# Patient Record
Sex: MALE | Race: WHITE | Employment: FULL TIME | URBAN - NONMETROPOLITAN AREA
[De-identification: names, ages, dates, MRNs, and addresses within clinical notes are randomized per-mention and may not be internally consistent; named-entity substitution may affect disease eponyms.]

---

## 2019-04-15 ENCOUNTER — OFFICE VISIT (OUTPATIENT)
Dept: PRIMARY CARE CLINIC | Age: 59
End: 2019-04-15
Payer: COMMERCIAL

## 2019-04-15 VITALS
SYSTOLIC BLOOD PRESSURE: 139 MMHG | OXYGEN SATURATION: 97 % | WEIGHT: 315 LBS | BODY MASS INDEX: 47.14 KG/M2 | DIASTOLIC BLOOD PRESSURE: 89 MMHG | TEMPERATURE: 98 F | HEART RATE: 84 BPM

## 2019-04-15 DIAGNOSIS — R05.9 COUGH: ICD-10-CM

## 2019-04-15 DIAGNOSIS — J20.9 BRONCHITIS, ACUTE, WITH BRONCHOSPASM: Primary | ICD-10-CM

## 2019-04-15 LAB
INFLUENZA A ANTIBODY: NEGATIVE
INFLUENZA B ANTIBODY: NEGATIVE

## 2019-04-15 PROCEDURE — 99203 OFFICE O/P NEW LOW 30 MIN: CPT | Performed by: NURSE PRACTITIONER

## 2019-04-15 PROCEDURE — 87804 INFLUENZA ASSAY W/OPTIC: CPT | Performed by: NURSE PRACTITIONER

## 2019-04-15 PROCEDURE — 94640 AIRWAY INHALATION TREATMENT: CPT | Performed by: NURSE PRACTITIONER

## 2019-04-15 RX ORDER — BENZONATATE 100 MG/1
100 CAPSULE ORAL 3 TIMES DAILY PRN
Qty: 21 CAPSULE | Refills: 0 | Status: SHIPPED | OUTPATIENT
Start: 2019-04-15 | End: 2019-04-22

## 2019-04-15 RX ORDER — PREDNISONE 20 MG/1
TABLET ORAL
Qty: 10 TABLET | Refills: 0 | Status: SHIPPED | OUTPATIENT
Start: 2019-04-15 | End: 2020-03-06 | Stop reason: ALTCHOICE

## 2019-04-15 RX ORDER — ALBUTEROL SULFATE 2.5 MG/3ML
2.5 SOLUTION RESPIRATORY (INHALATION) ONCE
Status: COMPLETED | OUTPATIENT
Start: 2019-04-15 | End: 2019-04-15

## 2019-04-15 RX ORDER — ALBUTEROL SULFATE 90 UG/1
AEROSOL, METERED RESPIRATORY (INHALATION)
Qty: 1 INHALER | Refills: 0 | Status: ON HOLD | OUTPATIENT
Start: 2019-04-15 | End: 2020-03-06 | Stop reason: ALTCHOICE

## 2019-04-15 RX ORDER — AMOXICILLIN AND CLAVULANATE POTASSIUM 875; 125 MG/1; MG/1
1 TABLET, FILM COATED ORAL 2 TIMES DAILY
Qty: 20 TABLET | Refills: 0 | Status: SHIPPED | OUTPATIENT
Start: 2019-04-15 | End: 2019-04-25

## 2019-04-15 RX ADMIN — ALBUTEROL SULFATE 2.5 MG: 2.5 SOLUTION RESPIRATORY (INHALATION) at 11:56

## 2019-04-15 ASSESSMENT — ENCOUNTER SYMPTOMS
WHEEZING: 1
COUGH: 1
SHORTNESS OF BREATH: 1
SINUS PAIN: 0
NAUSEA: 0
SINUS PRESSURE: 0
RHINORRHEA: 1
DIARRHEA: 0
VOMITING: 0
SORE THROAT: 1

## 2019-04-15 NOTE — PATIENT INSTRUCTIONS
label.  · Breathe moist air from a humidifier, hot shower, or sink filled with hot water. The heat and moisture will thin mucus so you can cough it out. · Do not smoke. Smoking can make bronchitis worse. If you need help quitting, talk to your doctor about stop-smoking programs and medicines. These can increase your chances of quitting for good. When should you call for help? Call 911 anytime you think you may need emergency care. For example, call if:    · You have severe trouble breathing.    Call your doctor now or seek immediate medical care if:    · You have new or worse trouble breathing.     · You cough up dark brown or bloody mucus (sputum).     · You have a new or higher fever.     · You have a new rash.    Watch closely for changes in your health, and be sure to contact your doctor if:    · You cough more deeply or more often, especially if you notice more mucus or a change in the color of your mucus.     · You are not getting better as expected. Where can you learn more? Go to https://[employer].[email or web address] org and sign in to your [employer] account. Enter H333 in the Spotted box to learn more about \"Bronchitis: Care Instructions. \"     If you do not have an account, please click on the \"Sign Up Now\" link. Current as of: September 5, 2018  Content Version: 11.9  © 8671-1417 [employer]. Care instructions adapted under license by [facility] ([facility]). If you have questions about a medical condition or this instruction, always ask your healthcare professional. [place] [address] any warranty or liability for your use of this information. Patient Education        amoxicillin and clavulanate potassium  Pronunciation:  am OK i RAMOS in [address] ate mac TAS ee um  Brand:  Augmentin, Augmentin ES-600, Augmentin XR  What is the most important information I should know about amoxicillin and clavulanate potassium?   You should not use this medicine if you have severe kidney disease, if you have had liver problems or jaundice while taking amoxicillin and clavulanate potassium, or if you are allergic to any penicillin or cephalosporin antibiotic, such as Amoxil, Ceftin, Cefzil, Moxatag, Omnicef, and others. What is amoxicillin and clavulanate potassium? Amoxicillin is a penicillin antibiotic that fights bacteria in the body. Clavulanate potassium is a beta-lactamase inhibitor that helps prevent certain bacteria from becoming resistant to amoxicillin. Amoxicillin and clavulanate potassium is a combination medicine used to treat many different infections caused by bacteria, such as sinusitis, pneumonia, ear infections, bronchitis, urinary tract infections, and infections of the skin. Amoxicillin and clavulanate potassium may also be used for purposes not listed in this medication guide. What should I discuss with my healthcare provider before taking amoxicillin and clavulanate potassium? You should not use this medicine if you are allergic to it, or if:  · you have severe kidney disease (or if you are on dialysis);  · you have had liver problems or jaundice while taking amoxicillin and clavulanate potassium; or  · you are allergic to any penicillin or cephalosporin antibiotic, such as Amoxil, Ceftin, Cefzil, Moxatag, Omnicef, and others. To make sure amoxicillin and clavulanate potassium is safe for you, tell your doctor if you have ever had:  · liver disease (hepatitis or jaundice);  · kidney disease; or  · mononucleosis. It is not known whether this medicine will harm an unborn baby. Tell your doctor if you are pregnant or plan to become pregnant. Amoxicillin and clavulanate potassium can make birth control pills less effective. Ask your doctor about using a non-hormonal birth control (condom, diaphragm with spermicide) to prevent pregnancy. Amoxicillin and clavulanate potassium can pass into breast milk and may affect the nursing baby.  Tell your doctor if you are breast-feeding. Do not give this medicine to a child without medical advice. The liquid or chewable tablet may contain phenylalanine. Talk to your doctor before using these forms of this medicine if you have phenylketonuria (PKU). How should I take amoxicillin and clavulanate potassium? Follow all directions on your prescription label. Do not take this medicine in larger or smaller amounts or for longer than recommended. Take the medicine every 12 hours, at the start of a meal to reduce stomach upset. Do not crush or chew the extended-release tablet. Swallow the pill whole, or break the pill in half and take both halves one at a time. If you have trouble swallowing a whole or half pill, talk with your doctor about using another form of amoxicillin and clavulanate potassium. The chewable tablet must be chewed before you swallow it. Shake the liquid medicine well just before you measure a dose. Measure liquid medicine with the dosing syringe provided, or with a special dose-measuring spoon or medicine cup. If you do not have a dose-measuring device, ask your pharmacist for one. Use this medicine for the full prescribed length of time. Your symptoms may improve before the infection is completely cleared. Skipping doses may also increase your risk of further infection that is resistant to antibiotics. Amoxicillin and clavulanate potassium will not treat a viral infection such as the flu or a common cold. This medicine can cause unusual results with certain lab tests for glucose (sugar) in the urine. Tell any doctor who treats you that you are using amoxicillin and clavulanate potassium. Store the tablets at room temperature away from moisture and heat. Store the liquid  in the refrigerator. Throw away any unused liquid after 10 days. What happens if I miss a dose? Take the missed dose as soon as you remember. Skip the missed dose if it is almost time for your next scheduled dose.  Do not take extra medicine to make up the missed dose. What happens if I overdose? Seek emergency medical attention or call the Poison Help line at 1-507.634.1701. Overdose can cause nausea, vomiting, stomach pain, diarrhea, skin rash, drowsiness, hyperactivity, and decreased urination. What should I avoid while taking amoxicillin and clavulanate potassium? Avoid taking this medicine together with or just after eating a high-fat meal. This will make it harder for your body to absorb the medication. Antibiotic medicines can cause diarrhea, which may be a sign of a new infection. If you have diarrhea that is watery or bloody, call your doctor. Do not use anti-diarrhea medicine unless your doctor tells you to. What are the possible side effects of amoxicillin and clavulanate potassium? Get emergency medical help if you have signs of an allergic reaction: hives; difficult breathing; swelling of your face, lips, tongue, or throat. Call your doctor at once if you have:  · severe stomach pain, diarrhea that is watery or bloody;  · pale or yellowed skin, dark colored urine, fever, confusion or weakness;  · loss of appetite, upper stomach pain, jaundice (yellowing of the skin or eyes);  · easy bruising or bleeding;  · little or no urination; or  · severe skin reaction --fever, sore throat, swelling in your face or tongue, burning in your eyes, skin pain followed by a red or purple skin rash that spreads (especially in the face or upper body) and causes blistering and peeling. Common side effects may include:  · nausea, diarrhea; or  · vaginal itching or discharge; This is not a complete list of side effects and others may occur. Call your doctor for medical advice about side effects. You may report side effects to FDA at 6-217-FDA-7500. What other drugs will affect amoxicillin and clavulanate potassium?   Tell your doctor about all your current medicines and any you start or stop using, especially:  · allopurinol;  · probenecid; or  · a blood thinner --warfarin, Coumadin, Jantoven. This list is not complete. Other drugs may interact with amoxicillin and clavulanate potassium, including prescription and over-the-counter medicines, vitamins, and herbal products. Not all possible interactions are listed in this medication guide. Where can I get more information? Your pharmacist can provide more information about amoxicillin and clavulanate potassium. Remember, keep this and all other medicines out of the reach of children, never share your medicines with others, and use this medication only for the indication prescribed. Every effort has been made to ensure that the information provided by [address] is accurate, up-to-date, and complete, but no guarantee is made to that effect. Drug information contained herein may be time sensitive. [facility] information has been compiled for use by healthcare practitioners and consumers in the [place] and therefore [facility] does not warrant that uses outside of the [place] are appropriate, unless specifically indicated otherwise. [facility]'s drug information does not endorse drugs, diagnose patients or recommend therapy. [facility]'s drug information is an informational resource designed to assist licensed healthcare practitioners in caring for their patients and/or to serve consumers viewing this service as a supplement to, and not a substitute for, the expertise, skill, knowledge and judgment of healthcare practitioners. The absence of a warning for a given drug or drug combination in no way should be construed to indicate that the drug or drug combination is safe, effective or appropriate for any given patient. [facility] does not assume any responsibility for any aspect of healthcare administered with the aid of information [facility] provides.  The information contained herein is not intended to cover all possible uses, directions, precautions, warnings, drug interactions, allergic reactions, or adverse effects. If you have questions about the drugs you are taking, check with your doctor, nurse or pharmacist.  Copyright 5727-2376 [facility] CINTR[place]. Version: 11.02. Revision date: 1/2/2018. Care instructions adapted under license by [facility] ([facility]). If you have questions about a medical condition or this instruction, always ask your healthcare professional. [place] [address] any warranty or liability for your use of this information. Patient Education        prednisone  Pronunciation:  PRED ni ari  Brand:  Ghazal, Sterapred, Sterapred 12 DAY, Sterapred DS, Sterapred DS 12 DAY  What is the most important information I should know about prednisone? Prednisone treats many different conditions such as allergic disorders, skin conditions, ulcerative colitis, arthritis, lupus, psoriasis, or breathing disorders. You should not take prednisone if you have a fungal infection anywhere in your body. Steroid medication can weaken your immune system, making it easier for you to get an infection. Avoid being near people who are sick or have infections. Do not receive a \"live\" vaccine while using prednisone. Call your doctor at once if you have shortness of breath, severe pain in your upper stomach, bloody or tarry stools, severe depression, changes in personality or behavior, vision problems, or eye pain. You should not stop using prednisone suddenly. Follow your doctor's instructions about tapering your dose. What is prednisone? Prednisone is a steroid. Prednisone prevents the release of substances in the body that cause inflammation. Prednisone also suppresses the immune system. Prednisone is used as an anti-inflammatory or an immunosuppressant medication. Prednisone treats many different conditions such as allergic disorders, skin conditions, ulcerative colitis, arthritis, lupus, psoriasis, or breathing disorders.   Prednisone may also be used for purposes not listed in this medication best results. Do not take this medicine in larger or smaller amounts or for longer than recommended. Take with food. Your dosage needs may change if you have any unusual stress such as a serious illness, fever or infection, or if you have surgery or a medical emergency. Do not change your medication dose or schedule without your doctor's advice. Measure liquid medicine with a special dose-measuring spoon or medicine cup. If you do not have a dose-measuring device, ask your pharmacist for one. Do not crush, chew, or break a delayed-release tablet. Swallow it whole. While using prednisone, you may need frequent blood tests at your doctor's office. Your blood pressure may also need to be checked. This medication can cause unusual results with certain medical tests. Tell any doctor who treats you that you are using prednisone. You should not stop using prednisone suddenly. Follow your doctor's instructions about tapering your dose. Wear a medical alert tag or carry an ID card stating that you take prednisone. Any medical care provider who treats you should know that you are using a steroid. Store at room temperature away from moisture and heat. What happens if I miss a dose? Take the missed dose as soon as you remember. Skip the missed dose if it is almost time for your next scheduled dose. Do not take extra medicine to make up the missed dose. What happens if I overdose? Seek emergency medical attention or call the Poison Help line at 1-194.637.9191. An overdose of prednisone is not expected to produce life threatening symptoms. However, long term use of high steroid doses can lead to symptoms such as thinning skin, easy bruising, changes in the shape or location of body fat (especially in your face, neck, back, and waist), increased acne or facial hair, menstrual problems, impotence, or loss of interest in sex. What should I avoid while taking prednisone?   Avoid being near people who are sick or have infections. Call your doctor for preventive treatment if you are exposed to chicken pox or measles. These conditions can be serious or even fatal in people who are using a steroid. Do not receive a \"live\" vaccine while using prednisone. Prednisone may increase your risk of harmful effects from a live vaccine. Live vaccines include measles, mumps, rubella (MMR), rotavirus, typhoid, yellow fever, varicella (chickenpox), zoster (shingles), and nasal flu (influenza) vaccine. Avoid drinking alcohol while you are taking prednisone. What are the possible side effects of prednisone? Get emergency medical help if you have any of these signs of an allergic reaction: hives; difficult breathing; swelling of your face, lips, tongue, or throat. Call your doctor at once if you have:  · blurred vision, eye pain, or seeing halos around lights;  · swelling, rapid weight gain, feeling short of breath;  · severe depression, feelings of extreme happiness or sadness, changes in personality or behavior, seizure (convulsions);  · bloody or tarry stools, coughing up blood;  · pancreatitis (severe pain in your upper stomach spreading to your back, nausea and vomiting, fast heart rate);  · low potassium (confusion, uneven heart rate, extreme thirst, increased urination, leg discomfort, muscle weakness or limp feeling); or  · dangerously high blood pressure (severe headache, blurred vision, buzzing in your ears, anxiety, confusion, chest pain, shortness of breath, uneven heartbeats, seizure). Other common side effects may include:  · sleep problems (insomnia), mood changes;  · increased appetite, gradual weight gain;  · acne, increased sweating, dry skin, thinning skin, bruising or discoloration;  · slow wound healing;  · headache, dizziness, spinning sensation;  · nausea, stomach pain, bloating; or  · changes in the shape or location of body fat (especially in your arms, legs, face, neck, breasts, and waist).   This is not a complete list of side effects and others may occur. Call your doctor for medical advice about side effects. You may report side effects to FDA at 7-147-AMO-3093. What other drugs will affect prednisone? Many drugs can interact with prednisone. Not all possible interactions are listed here. Tell your doctor about all your medications and any you start or stop using during treatment with prednisone, especially:  · amphotericin B;  · cyclosporine;  · digoxin, digitalis;  · Abel's wort;  · an antibiotic such as clarithromycin or telithromycin;  · antifungal medication such as itraconazole, ketoconazole, posaconazole, voriconazole;  · birth control pills and other hormones;  · a blood thinner such as warfarin, Coumadin;  · a diuretic or \"water pill\";  · the hepatitis C medications boceprevir or telaprevir;  · HIV or AIDS medicine such as atazanavir, delavirdine, efavirenz, fosamprenavir, indinavir, nelfinavir, nevirapine, ritonavir, saquinavir;  · insulin or diabetes medications you take by mouth;  · a non-steroidal anti-inflammatory drug (NSAID) such as aspirin, ibuprofen (Advil, Motrin), naproxen (Aleve), celecoxib, diclofenac, indomethacin, meloxicam, and others;  · seizure medications such as carbamazepine, fosphenytoin, oxcarbazepine, phenobarbital, phenytoin, primidone; or  · the tuberculosis medications isoniazid, rifabutin, rifapentine, or rifampin. This list is not complete and many other drugs can interact with prednisone. This includes prescription and over-the-counter medicines, vitamins, and herbal products. Give a list of all your medicines to any healthcare provider who treats you. Where can I get more information? Your pharmacist can provide more information about prednisone. Remember, keep this and all other medicines out of the reach of children, never share your medicines with others, and use this medication only for the indication prescribed.   Every effort has been made to ensure that the information provided by [address] [place], [place] [address]can Dr is accurate, up-to-date, and complete, but no guarantee is made to that effect. Drug information contained herein may be time sensitive. [facility] information has been compiled for use by healthcare practitioners and consumers in the [place] and therefore [facility] does not warrant that uses outside of the [place] are appropriate, unless specifically indicated otherwise. [facility]'s drug information does not endorse drugs, diagnose patients or recommend therapy. [facility] drug information is an informational resource designed to assist licensed healthcare practitioners in caring for their patients and/or to serve consumers viewing this service as a supplement to, and not a substitute for, the expertise, skill, knowledge and judgment of healthcare practitioners. The absence of a warning for a given drug or drug combination in no way should be construed to indicate that the drug or drug combination is safe, effective or appropriate for any given patient. [facility] does not assume any responsibility for any aspect of healthcare administered with the aid of information [facility] provides. The information contained herein is not intended to cover all possible uses, directions, precautions, warnings, drug interactions, allergic reactions, or adverse effects. If you have questions about the drugs you are taking, check with your doctor, nurse or pharmacist.  Copyright 3991-4310 [address] Version: 9.01. Revision date: 2/13/2013. Care instructions adapted under license by [facility] ([facility]). If you have questions about a medical condition or this instruction, always ask your healthcare professional. [place] [address] any warranty or liability for your use of this information.      Patient Education        albuterol inhalation  Pronunciation:  mena [address] all  Brand:  ProAir HFA, ProAir RespiClick, Proventil HFA, Ventolin HFA  What is the most important information I also need to shake your medicine just before each use. Follow all medication instructions very carefully. Do not allow a young child to use albuterol inhalation without help from an adult. The usual dose of albuterol inhalation is 2 inhalations every 4 to 6 hours. To prevent exercise-induced bronchospasm, use 2 inhalations 15 to 30 minutes before you exercise. The effects of albuterol inhalation should last about 4 to 6 hours. Seek medical attention if you think your asthma medications are not working as well. An increased need for medication could be an early sign of a serious asthma attack. Use the dose counter on your inhaler device and get your prescription refilled before you run out of medicine completely. Always use the new inhaler device provided with your refill. Do not float a medicine canister in water to see if it is empty. Follow all product instructions on how to clean your inhaler device and mouthpiece. Do not try to clean or take apart the ProAir RespiClick inhaler device. Asthma is often treated with a combination of drugs. Use all medications as directed by your doctor. Read the medication guide or patient instructions provided with each medication. Do not change your doses or medication schedule without your doctor's advice. Store this medicine at room temperature away from moisture, heat, or cold temperatures. Keep the medicine canister away from open flame or high heat, such as in a car on a hot day. The canister may explode if it gets too hot. Do not puncture or burn an empty inhaler canister. What happens if I miss a dose? Use the missed dose as soon as you remember. Skip the missed dose if it is almost time for your next scheduled dose. Do not use extra medicine to make up the missed dose. What happens if I overdose? Seek emergency medical attention or call the Poison Help line at 1-799.489.2415.  An overdose of albuterol can be fatal.  Overdose symptoms may include dry mouth, tremors, chest pain, fast heartbeats, nausea, general ill feeling, seizure (convulsions), feeling light-headed or fainting. What should I avoid while using albuterol inhalation? Rinse with water if this medicine gets in your eyes. What are the possible side effects of albuterol inhalation? Get emergency medical help if you have signs of an allergic reaction: hives; difficult breathing; swelling of your face, lips, tongue, or throat. Call your doctor at once if you have:  · wheezing, choking, or other breathing problems after using this medicine;  · chest pain, fast heart rate, pounding heartbeats or fluttering in your chest;  · pain or burning when you urinate;  · high blood sugar --increased thirst, increased urination, hunger, dry mouth, fruity breath odor, drowsiness, dry skin, blurred vision, weight loss; or  · low potassium --leg cramps, constipation, irregular heartbeats, fluttering in your chest, extreme thirst, increased urination, numbness or tingling, muscle weakness or limp feeling. Common side effects may include:  · back pain, body aches;  · headache, dizziness;  · feeling nervous;  · nausea, diarrhea, upset stomach; or  · sore throat, sinus pain, stuffy runny nose. This is not a complete list of side effects and others may occur. Call your doctor for medical advice about side effects. You may report side effects to FDA at 5-324-FDA-1942. What other drugs will affect albuterol inhalation?   Tell your doctor about all your current medicines and any you start or stop using, especially:  · any other inhaled medicines or bronchodilators;  · digoxin;  · a diuretic or \"water pill\";  · an antidepressant --amitriptyline, desipramine, imipramine, doxepin, nortriptyline, and others;  · a beta blocker --atenolol, carvedilol, labetalol, metoprolol, propranolol, sotalol, and others; or  · an MAO inhibitor --isocarboxazid, linezolid, methylene blue injection, phenelzine, rasagiline, selegiline, tranylcypromine, and others. This list is not complete. Other drugs may interact with albuterol inhalation, including prescription and over-the-counter medicines, vitamins, and herbal products. Not all possible interactions are listed in this medication guide. Where can I get more information? Your pharmacist can provide more information about albuterol inhalation. Remember, keep this and all other medicines out of the reach of children, never share your medicines with others, and use this medication only for the indication prescribed. Every effort has been made to ensure that the information provided by [address] is accurate, up-to-date, and complete, but no guarantee is made to that effect. Drug information contained herein may be time sensitive. [facility] information has been compiled for use by healthcare practitioners and consumers in the [place] and therefore [facility] does not warrant that uses outside of the [place] are appropriate, unless specifically indicated otherwise. [facility]'s drug information does not endorse drugs, diagnose patients or recommend therapy. [facility] drug information is an informational resource designed to assist licensed healthcare practitioners in caring for their patients and/or to serve consumers viewing this service as a supplement to, and not a substitute for, the expertise, skill, knowledge and judgment of healthcare practitioners. The absence of a warning for a given drug or drug combination in no way should be construed to indicate that the drug or drug combination is safe, effective or appropriate for any given patient. [facility] does not assume any responsibility for any aspect of healthcare administered with the aid of information [facility] provides. The information contained herein is not intended to cover all possible uses, directions, precautions, warnings, drug interactions, allergic reactions, or adverse effects.  If you have questions about the drugs you are taking, check with your doctor, nurse or pharmacist.  Copyright 0382-3929 [address]: 7.01. Revision date: 8/26/2015. Care instructions adapted under license by [facility] ([facility]). If you have questions about a medical condition or this instruction, always ask your healthcare professional. [address] any warranty or liability for your use of this information. Patient Education        benzonatate  Pronunciation:  adonis perry  Brand:  Tessalon, Tessalon Perles, Zonatuss  What is the most important information I should know about benzonatate? You should not use this medication if you are allergic to benzonatate or topical numbing medicines such as tetracaine or procaine (found in some insect bite and sunburn creams). Never suck or chew on a benzonatate capsule. Swallow the pill whole. Sucking or chewing the capsule may cause your mouth and throat to feel numb or cause other serious side effects. Serious side effects of benzonatate include choking feeling, chest pain or numbness, feeling like you might pass out, confusion, or hallucinations. Some of these side effects may result from chewing or sucking on a benzonatate capsule. Do not give this medication to a child younger than 8years old without medical advice. An overdose of benzonatate can be fatal to a child. What is benzonatate? Benzonatate is a non-narcotic cough medicine. It works by numbing the throat and lungs, making the cough reflex less active. Benzonatate is used to relieve coughing. Benzonatate may also be used for purposes not listed in this medication guide. What should I discuss with my healthcare provider before taking benzonatate? You should not use this medication if you are allergic to benzonatate or topical numbing medicines such as tetracaine or procaine (found in some insect bite and sunburn creams). FDA pregnancy category C.  It is not known whether benzonatate will harm an unborn baby. Tell your doctor if you are pregnant or plan to become pregnant while using this medication. It is not known whether benzonatate passes into breast milk or if it could harm a nursing baby. Do not use this medication without telling your doctor if you are breast-feeding a baby. Do not give this medication to a child younger than 8years old without medical advice. An overdose of benzonatate can be fatal, especially to a child. How should I take benzonatate? Take exactly as prescribed by your doctor. Do not take in larger or smaller amounts or for longer than recommended. Follow the directions on your prescription label. Always ask a doctor before giving a cough medicine to a child. Death can occur from the misuse of cough and cold medicines in very young children. Take each dose with a full glass of water. Never suck or chew on a benzonatate capsule. Swallow the pill whole. Sucking or chewing the capsule may cause your mouth and throat to feel numb or cause other serious side effects. Store at room temperature away from moisture, heat, and light. What happens if I miss a dose? Take the missed dose as soon as you remember. Skip the missed dose if it is almost time for your next scheduled dose. Do not  take extra medicine to make up the missed dose. What happens if I overdose? Seek emergency medical attention or call the Poison Help line at 1-825.358.1202. An overdose of benzonatate can be fatal, especially to a child. Accidental death has occurred in children under 3years old who took only 1 or 2 capsules. Overdose symptoms may include numbness in the mouth or throat, feeling restless or very sleepy, tremors or shaking, seizure (convulsions), slow heart rate, weak pulse, fainting, and slow breathing (breathing may stop). What should I avoid while taking benzonatate?   Follow your doctor's instructions about any restrictions on food, beverages, or activity while you are using contained herein may be time sensitive. [employer] information has been compiled for use by healthcare practitioners and consumers in the [place] and therefore [employer] does not warrant that uses outside of the [place] are appropriate, unless specifically indicated otherwise. [employer] drug information does not endorse drugs, diagnose patients or recommend therapy. [employer] drug information is an informational resource designed to assist licensed healthcare practitioners in caring for their patients and/or to serve consumers viewing this service as a supplement to, and not a substitute for, the expertise, skill, knowledge and judgment of healthcare practitioners. The absence of a warning for a given drug or drug combination in no way should be construed to indicate that the drug or drug combination is safe, effective or appropriate for any given patient. [facility] does not assume any responsibility for any aspect of healthcare administered with the aid of information [facility] provides. The information contained herein is not intended to cover all possible uses, directions, precautions, warnings, drug interactions, allergic reactions, or adverse effects. If you have questions about the drugs you are taking, check with your doctor, nurse or pharmacist.  Copyright 6938-7629 [address] Version: 8.01. Revision date: 3/9/2011. Care instructions adapted under license by [facility] ([facility]). If you have questions about a medical condition or this instruction, always ask your healthcare professional. [place] [address] any warranty or liability for your use of this information. · Tessalon Perles as prescribed as needed for cough: swallow whole, no more than 3 doses in 24 hours and do not take with other cough meds. · Antibiotic as directed. Take until all doses are completed. · Probiotic or greek yogurt daily while on antibiotic. · Prednisone 2 tabs daily x 5 days.   Complete all doses as prescribed. Denies history of impaired liver function, diabetes, CHF, systemic fungal infection, osteoporosis, or glaucoma. Take with food at same time each day. Take the prednisone as directed on the prescription. Prednisone is very bitter so swallow tablets quickly to prevent dissolving in mouth. After taking, don't lay down for 2 hours to help prevent reflux. · Albuterol Inhaler 1-2 puffs every 4 to 6 hours as needed for wheezing or shortness of breath. Rinse mouth after use. · Practice meticulous handwashing and cover cough to prevent spread of infection  · Advised on smoking cessation  · Encouraged to increase fluids and rest  · Aleve/Ibuprofen/Tylenol OTC PRN for pain, discomfort or fever as directed on package. Take with food. · Cool mist humidifier  · Hot tea with honey and lemon for cough PRN  · Patient instructions given for acute bronchitis, augmentin, prednisone, albuterol and tessalon perles. · To ER or call 911 if any difficulty breathing, shortness of breath, inability to swallow, hives, rash, facial/tongue swelling or temp greater than 103 degrees. · Follow up with PCP or Walk in Care as needed if symptoms worsen or do not improve.

## 2019-04-15 NOTE — LETTER
[facility] [place] [id number]  Phone: [phone]  Fax: Litzy Warren, APRN - CNP        April 15, 2019     Patient: Letha Rollins   YOB: 1960   Date of Visit: 4/15/2019       To Whom it May Concern:    Bennett Oneill was seen in my clinic on 4/15/2019. He may return to work on 04/16/2019. Please excuse for 04/15/2019. If you have any questions or concerns, please don't hesitate to call.     Sincerely,         Chon Truong, APRN - CNP

## 2019-04-15 NOTE — PROGRESS NOTES
[facility] [address] [place] [id number]  Dept: [phone]  Dept Fax: [phone]     Ross Ramirez is a 66 y.o. male who presents to the [facility] in Care today for hismedical conditions/complaints as noted below. Ross Ramirez is c/o of Cough (Patient presents today for cough, fever, body aches for 1 week. )      HPI:     Cough   This is a new problem. The current episode started 1 to 4 weeks ago (Started 1 week ago with productive cough of thick green, nasal congestion, fever of 101 degrees, headache and body aches. ). The problem has been gradually worsening. The problem occurs every few minutes. The cough is productive of sputum (thick green). Associated symptoms include a fever, headaches, myalgias (from coughing), nasal congestion, rhinorrhea, a sore throat, shortness of breath and wheezing. Pertinent negatives include no chills, ear congestion, ear pain or rash. Exacerbated by: Worse first thing in morning. Risk factors for lung disease include smoking/tobacco exposure. Treatments tried: Tylenol Cold (generic) The treatment provided mild relief. His past medical history is significant for asthma. There is no history of bronchitis, COPD, emphysema, environmental allergies or pneumonia. Past Medical History:   Diagnosis Date    Hypertension         Current Outpatient Medications   Medication Sig Dispense Refill    albuterol sulfate  (90 Base) MCG/ACT inhaler Inhale 1-2 puffs every 6 hrs as needed for wheezing or shortness of breath. 1 Inhaler 0    predniSONE (DELTASONE) 20 MG tablet Take 2 tabs by mouth daily x 5 days. Take with food. 10 tablet 0    amoxicillin-clavulanate (AUGMENTIN) 875-125 MG per tablet Take 1 tablet by mouth 2 times daily for 10 days 20 tablet 0    benzonatate (TESSALON PERLES) 100 MG capsule Take 1 capsule by mouth 3 times daily as needed for Cough (Swallow whole.   No more than 3 doses in 24 hrs.) 21 capsule 0    terazosin (HYTRIN) 5 MG capsule Take 5 mg by mouth nightly      lisinopril (PRINIVIL;ZESTRIL) 10 MG tablet Take 20 mg by mouth daily        No current facility-administered medications for this visit. No Known Allergies    Subjective:     Review of Systems   Constitutional: Positive for appetite change (got to make self eat), fatigue and fever. Negative for chills and diaphoresis. HENT: Positive for congestion, rhinorrhea and sore throat. Negative for ear pain, sinus pressure and sinus pain. Respiratory: Positive for cough, shortness of breath and wheezing. Gastrointestinal: Negative for diarrhea, nausea and vomiting. Musculoskeletal: Positive for myalgias (from coughing). Skin: Negative for rash and wound. Allergic/Immunologic: Negative for environmental allergies. Neurological: Positive for light-headedness (some) and headaches. Negative for dizziness. Objective:      Physical Exam   Constitutional: He is oriented to person, place, and time. Vital signs are normal. He appears well-developed and well-nourished. He is cooperative. He does not appear ill. No distress. HENT:   Head: Normocephalic and atraumatic. Right Ear: Hearing, tympanic membrane, external ear and ear canal normal. No mastoid tenderness. Tympanic membrane is not injected, not erythematous and not bulging. No middle ear effusion. Left Ear: Hearing, tympanic membrane, external ear and ear canal normal. No mastoid tenderness. Tympanic membrane is not injected, not erythematous and not bulging. No middle ear effusion. Nose: Mucosal edema and rhinorrhea present. Right sinus exhibits no maxillary sinus tenderness and no frontal sinus tenderness. Left sinus exhibits no maxillary sinus tenderness and no frontal sinus tenderness. Mouth/Throat: Uvula is midline and mucous membranes are normal. Posterior oropharyngeal erythema (slight erythema) present.  No oropharyngeal exudate, posterior benzonatate (TESSALON PERLES) 100 MG capsule   2. Cough  POCT Influenza A/B       Plan:      Return if symptoms worsen or fail to improve. Orders Placed This Encounter   Medications    albuterol (PROVENTIL) nebulizer solution 2.5 mg    albuterol sulfate  (90 Base) MCG/ACT inhaler     Sig: Inhale 1-2 puffs every 6 hrs as needed for wheezing or shortness of breath. Dispense:  1 Inhaler     Refill:  0    predniSONE (DELTASONE) 20 MG tablet     Sig: Take 2 tabs by mouth daily x 5 days. Take with food. Dispense:  10 tablet     Refill:  0    amoxicillin-clavulanate (AUGMENTIN) 875-125 MG per tablet     Sig: Take 1 tablet by mouth 2 times daily for 10 days     Dispense:  20 tablet     Refill:  0    benzonatate (TESSALON PERLES) 100 MG capsule     Sig: Take 1 capsule by mouth 3 times daily as needed for Cough (Swallow whole. No more than 3 doses in 24 hrs.)     Dispense:  21 capsule     Refill:  0      · Albuterol 2.5 mg aerosol treatment given in clinic. Tolerated well. O2 sat 93%, RR 18, HR 88. Breath sounds continue with expiratory wheezes B/L anterior and posterior lobes. Reports that he is breathing better. · Tessalon Perles as prescribed as needed for cough: swallow whole, no more than 3 doses in 24 hours and do not take with other cough meds. · Antibiotic as directed. Take until all doses are completed. · Probiotic or greek yogurt daily while on antibiotic. · Prednisone 2 tabs daily x 5 days. Complete all doses as prescribed. Denies history of impaired liver function, diabetes, CHF, systemic fungal infection, osteoporosis, or glaucoma. Take with food at same time each day. Take the prednisone as directed on the prescription. Prednisone is very bitter so swallow tablets quickly to prevent dissolving in mouth. After taking, don't lay down for 2 hours to help prevent reflux. · Albuterol Inhaler 1-2 puffs every 4 to 6 hours as needed for wheezing or shortness of breath.   Rinse mouth after use. · Practice meticulous handwashing and cover cough to prevent spread of infection  · Advised on smoking cessation  · Encouraged to increase fluids and rest  · Aleve/Ibuprofen/Tylenol OTC PRN for pain, discomfort or fever as directed on package. Take with food. · Cool mist humidifier  · Hot tea with honey and lemon for cough PRN  · Patient instructions given for acute bronchitis, augmentin, prednisone, albuterol and tessalon perles. · To ER or call 911 if any difficulty breathing, shortness of breath, inability to swallow, hives, rash, facial/tongue swelling or temp greater than 103 degrees. · Follow up with PCP or Walk in Care as needed if symptoms worsen or do not improve. Kory received counseling on the following healthy behaviors: medication adherence. Patient given educational materials - see patient instructions. Discussed use,benefit, and side effects of prescribed medications. Treatment plan discussed atvisit. Continue routine health care follow up. All patient questions answered. Pt voiced understanding.       Electronically signed by IOANA Gil CNP on 4/15/2019 at 6:23 PM

## 2020-03-06 ENCOUNTER — APPOINTMENT (OUTPATIENT)
Dept: CT IMAGING | Age: 60
End: 2020-03-06
Payer: COMMERCIAL

## 2020-03-06 ENCOUNTER — HOSPITAL ENCOUNTER (EMERGENCY)
Age: 60
Discharge: ANOTHER HOSPITAL | End: 2020-03-06
Attending: EMERGENCY MEDICINE
Payer: COMMERCIAL

## 2020-03-06 ENCOUNTER — APPOINTMENT (OUTPATIENT)
Dept: GENERAL RADIOLOGY | Age: 60
End: 2020-03-06
Payer: COMMERCIAL

## 2020-03-06 ENCOUNTER — APPOINTMENT (OUTPATIENT)
Dept: GENERAL RADIOLOGY | Age: 60
DRG: 224 | End: 2020-03-06
Attending: INTERNAL MEDICINE
Payer: COMMERCIAL

## 2020-03-06 ENCOUNTER — HOSPITAL ENCOUNTER (INPATIENT)
Age: 60
LOS: 5 days | Discharge: HOME | DRG: 224 | End: 2020-03-11
Attending: INTERNAL MEDICINE | Admitting: INTERNAL MEDICINE
Payer: COMMERCIAL

## 2020-03-06 VITALS
BODY MASS INDEX: 43.4 KG/M2 | HEIGHT: 71 IN | WEIGHT: 310 LBS | TEMPERATURE: 97.9 F | SYSTOLIC BLOOD PRESSURE: 179 MMHG | HEART RATE: 73 BPM | DIASTOLIC BLOOD PRESSURE: 90 MMHG | OXYGEN SATURATION: 96 % | RESPIRATION RATE: 20 BRPM

## 2020-03-06 PROBLEM — I46.9 CARDIAC ARREST (HCC): Status: ACTIVE | Noted: 2020-03-06

## 2020-03-06 LAB
ABSOLUTE EOS #: 0.3 K/UL (ref 0–0.4)
ABSOLUTE IMMATURE GRANULOCYTE: ABNORMAL K/UL (ref 0–0.3)
ABSOLUTE LYMPH #: 3.4 K/UL (ref 1–4.8)
ABSOLUTE MONO #: 0.9 K/UL (ref 0–1)
ALBUMIN SERPL-MCNC: 4.5 G/DL (ref 3.5–5.2)
ALBUMIN/GLOBULIN RATIO: ABNORMAL (ref 1–2.5)
ALLEN TEST: POSITIVE
ALP BLD-CCNC: 83 U/L (ref 40–129)
ALT SERPL-CCNC: 66 U/L (ref 5–41)
ANION GAP SERPL CALCULATED.3IONS-SCNC: 15 MMOL/L (ref 9–17)
AST SERPL-CCNC: 80 U/L
BASOPHILS # BLD: 0 % (ref 0–2)
BASOPHILS ABSOLUTE: 0 K/UL (ref 0–0.2)
BILIRUB SERPL-MCNC: 0.35 MG/DL (ref 0.3–1.2)
BNP INTERPRETATION: NORMAL
BUN BLDV-MCNC: 15 MG/DL (ref 6–20)
BUN/CREAT BLD: 14 (ref 9–20)
CALCIUM IONIZED: 1.09 MMOL/L (ref 1.13–1.33)
CALCIUM SERPL-MCNC: 10 MG/DL (ref 8.6–10.4)
CHLORIDE BLD-SCNC: 99 MMOL/L (ref 98–107)
CO2: 26 MMOL/L (ref 20–31)
CREAT SERPL-MCNC: 1.04 MG/DL (ref 0.7–1.2)
D-DIMER QUANTITATIVE: 0.76 MG/L FEU (ref 0–0.5)
DIFFERENTIAL TYPE: YES
EOSINOPHILS RELATIVE PERCENT: 2 % (ref 0–5)
FIO2: ABNORMAL
GFR AFRICAN AMERICAN: >60 ML/MIN
GFR NON-AFRICAN AMERICAN: >60 ML/MIN
GFR SERPL CREATININE-BSD FRML MDRD: ABNORMAL ML/MIN/{1.73_M2}
GFR SERPL CREATININE-BSD FRML MDRD: ABNORMAL ML/MIN/{1.73_M2}
GLUCOSE BLD-MCNC: 173 MG/DL (ref 70–99)
HCT VFR BLD CALC: 49.5 % (ref 41–53)
HEMOGLOBIN: 16.6 G/DL (ref 13.5–17.5)
IMMATURE GRANULOCYTES: ABNORMAL %
LACTIC ACID, WHOLE BLOOD: 1.5 MMOL/L (ref 0.7–2.1)
LACTIC ACID: NORMAL MMOL/L
LYMPHOCYTES # BLD: 25 % (ref 13–44)
MAGNESIUM: 2.3 MG/DL (ref 1.6–2.6)
MCH RBC QN AUTO: 29.8 PG (ref 26–34)
MCHC RBC AUTO-ENTMCNC: 33.5 G/DL (ref 31–37)
MCV RBC AUTO: 89 FL (ref 80–100)
MODE: ABNORMAL
MONOCYTES # BLD: 7 % (ref 5–9)
NEGATIVE BASE EXCESS, ART: ABNORMAL (ref 0–2)
NRBC AUTOMATED: ABNORMAL PER 100 WBC
O2 DEVICE/FLOW/%: ABNORMAL
PATIENT TEMP: ABNORMAL
PDW BLD-RTO: 14.2 % (ref 12.1–15.2)
PHOSPHORUS: 3.7 MG/DL (ref 2.5–4.5)
PLATELET # BLD: 286 K/UL (ref 140–450)
PLATELET ESTIMATE: ABNORMAL
PMV BLD AUTO: ABNORMAL FL (ref 6–12)
POC HCO3: 30.5 MMOL/L (ref 21–28)
POC O2 SATURATION: 90 % (ref 94–98)
POC PCO2 TEMP: ABNORMAL MM HG
POC PCO2: 47.2 MM HG (ref 35–48)
POC PH TEMP: ABNORMAL
POC PH: 7.42 (ref 7.35–7.45)
POC PO2 TEMP: ABNORMAL MM HG
POC PO2: 58.7 MM HG (ref 83–108)
POSITIVE BASE EXCESS, ART: 5 (ref 0–3)
POTASSIUM SERPL-SCNC: 3.4 MMOL/L (ref 3.7–5.3)
PRO-BNP: 212 PG/ML
RBC # BLD: 5.57 M/UL (ref 4.5–5.9)
RBC # BLD: ABNORMAL 10*6/UL
SAMPLE SITE: ABNORMAL
SEG NEUTROPHILS: 66 % (ref 39–75)
SEGMENTED NEUTROPHILS ABSOLUTE COUNT: 8.8 K/UL (ref 2.1–6.5)
SODIUM BLD-SCNC: 140 MMOL/L (ref 135–144)
TCO2 (CALC), ART: 32 MMOL/L (ref 22–29)
TOTAL PROTEIN: 8 G/DL (ref 6.4–8.3)
TROPONIN INTERP: ABNORMAL
TROPONIN INTERP: NORMAL
TROPONIN T: <0.03 NG/ML
TROPONIN T: ABNORMAL NG/ML
TROPONIN, HIGH SENSITIVITY: 90 NG/L (ref 0–22)
TROPONIN, HIGH SENSITIVITY: NORMAL NG/L (ref 0–22)
WBC # BLD: 13.5 K/UL (ref 3.5–11)
WBC # BLD: ABNORMAL 10*3/UL

## 2020-03-06 PROCEDURE — 6370000000 HC RX 637 (ALT 250 FOR IP): Performed by: STUDENT IN AN ORGANIZED HEALTH CARE EDUCATION/TRAINING PROGRAM

## 2020-03-06 PROCEDURE — 85379 FIBRIN DEGRADATION QUANT: CPT

## 2020-03-06 PROCEDURE — 94640 AIRWAY INHALATION TREATMENT: CPT

## 2020-03-06 PROCEDURE — 85651 RBC SED RATE NONAUTOMATED: CPT

## 2020-03-06 PROCEDURE — 6360000002 HC RX W HCPCS: Performed by: STUDENT IN AN ORGANIZED HEALTH CARE EDUCATION/TRAINING PROGRAM

## 2020-03-06 PROCEDURE — 84484 ASSAY OF TROPONIN QUANT: CPT

## 2020-03-06 PROCEDURE — 82803 BLOOD GASES ANY COMBINATION: CPT

## 2020-03-06 PROCEDURE — 6360000002 HC RX W HCPCS

## 2020-03-06 PROCEDURE — 99285 EMERGENCY DEPT VISIT HI MDM: CPT

## 2020-03-06 PROCEDURE — 87449 NOS EACH ORGANISM AG IA: CPT

## 2020-03-06 PROCEDURE — 6360000002 HC RX W HCPCS: Performed by: EMERGENCY MEDICINE

## 2020-03-06 PROCEDURE — 80053 COMPREHEN METABOLIC PANEL: CPT

## 2020-03-06 PROCEDURE — 2700000000 HC OXYGEN THERAPY PER DAY

## 2020-03-06 PROCEDURE — 6360000004 HC RX CONTRAST MEDICATION: Performed by: EMERGENCY MEDICINE

## 2020-03-06 PROCEDURE — 85730 THROMBOPLASTIN TIME PARTIAL: CPT

## 2020-03-06 PROCEDURE — 71275 CT ANGIOGRAPHY CHEST: CPT

## 2020-03-06 PROCEDURE — 96375 TX/PRO/DX INJ NEW DRUG ADDON: CPT

## 2020-03-06 PROCEDURE — 87641 MR-STAPH DNA AMP PROBE: CPT

## 2020-03-06 PROCEDURE — 71045 X-RAY EXAM CHEST 1 VIEW: CPT

## 2020-03-06 PROCEDURE — 36415 COLL VENOUS BLD VENIPUNCTURE: CPT

## 2020-03-06 PROCEDURE — 83880 ASSAY OF NATRIURETIC PEPTIDE: CPT

## 2020-03-06 PROCEDURE — 2500000003 HC RX 250 WO HCPCS

## 2020-03-06 PROCEDURE — 85025 COMPLETE CBC W/AUTO DIFF WBC: CPT

## 2020-03-06 PROCEDURE — 83735 ASSAY OF MAGNESIUM: CPT

## 2020-03-06 PROCEDURE — 2000000000 HC ICU R&B

## 2020-03-06 PROCEDURE — 6370000000 HC RX 637 (ALT 250 FOR IP): Performed by: EMERGENCY MEDICINE

## 2020-03-06 PROCEDURE — 2500000003 HC RX 250 WO HCPCS: Performed by: EMERGENCY MEDICINE

## 2020-03-06 PROCEDURE — 0100U HC RESPIRPTHGN MULT REV TRANS & AMP PRB TECH 21 TRGT: CPT

## 2020-03-06 PROCEDURE — 93005 ELECTROCARDIOGRAM TRACING: CPT | Performed by: STUDENT IN AN ORGANIZED HEALTH CARE EDUCATION/TRAINING PROGRAM

## 2020-03-06 PROCEDURE — 82330 ASSAY OF CALCIUM: CPT

## 2020-03-06 PROCEDURE — 87040 BLOOD CULTURE FOR BACTERIA: CPT

## 2020-03-06 PROCEDURE — 84100 ASSAY OF PHOSPHORUS: CPT

## 2020-03-06 PROCEDURE — 94664 DEMO&/EVAL PT USE INHALER: CPT

## 2020-03-06 PROCEDURE — 96376 TX/PRO/DX INJ SAME DRUG ADON: CPT

## 2020-03-06 PROCEDURE — 80307 DRUG TEST PRSMV CHEM ANLYZR: CPT

## 2020-03-06 PROCEDURE — 93005 ELECTROCARDIOGRAM TRACING: CPT | Performed by: EMERGENCY MEDICINE

## 2020-03-06 PROCEDURE — 2580000003 HC RX 258: Performed by: STUDENT IN AN ORGANIZED HEALTH CARE EDUCATION/TRAINING PROGRAM

## 2020-03-06 PROCEDURE — 94761 N-INVAS EAR/PLS OXIMETRY MLT: CPT

## 2020-03-06 PROCEDURE — 83605 ASSAY OF LACTIC ACID: CPT

## 2020-03-06 PROCEDURE — 96374 THER/PROPH/DIAG INJ IV PUSH: CPT

## 2020-03-06 PROCEDURE — 36600 WITHDRAWAL OF ARTERIAL BLOOD: CPT

## 2020-03-06 RX ORDER — FAMOTIDINE 20 MG/1
20 TABLET, FILM COATED ORAL 2 TIMES DAILY
Status: DISCONTINUED | OUTPATIENT
Start: 2020-03-06 | End: 2020-03-08

## 2020-03-06 RX ORDER — HEPARIN SODIUM 10000 [USP'U]/100ML
INJECTION, SOLUTION INTRAVENOUS
Status: COMPLETED
Start: 2020-03-06 | End: 2020-03-06

## 2020-03-06 RX ORDER — FENTANYL CITRATE 50 UG/ML
INJECTION, SOLUTION INTRAMUSCULAR; INTRAVENOUS
Status: COMPLETED
Start: 2020-03-06 | End: 2020-03-06

## 2020-03-06 RX ORDER — ONDANSETRON 2 MG/ML
4 INJECTION INTRAMUSCULAR; INTRAVENOUS EVERY 6 HOURS PRN
Status: DISCONTINUED | OUTPATIENT
Start: 2020-03-06 | End: 2020-03-11 | Stop reason: HOSPADM

## 2020-03-06 RX ORDER — POTASSIUM CHLORIDE 7.45 MG/ML
10 INJECTION INTRAVENOUS PRN
Status: DISCONTINUED | OUTPATIENT
Start: 2020-03-06 | End: 2020-03-11 | Stop reason: HOSPADM

## 2020-03-06 RX ORDER — IPRATROPIUM BROMIDE AND ALBUTEROL SULFATE 2.5; .5 MG/3ML; MG/3ML
1 SOLUTION RESPIRATORY (INHALATION) 4 TIMES DAILY
Status: DISCONTINUED | OUTPATIENT
Start: 2020-03-06 | End: 2020-03-11 | Stop reason: HOSPADM

## 2020-03-06 RX ORDER — HEPARIN SODIUM 1000 [USP'U]/ML
4000 INJECTION, SOLUTION INTRAVENOUS; SUBCUTANEOUS ONCE
Status: DISCONTINUED | OUTPATIENT
Start: 2020-03-06 | End: 2020-03-09

## 2020-03-06 RX ORDER — ALBUTEROL SULFATE 2.5 MG/3ML
2.5 SOLUTION RESPIRATORY (INHALATION)
Status: DISCONTINUED | OUTPATIENT
Start: 2020-03-06 | End: 2020-03-06

## 2020-03-06 RX ORDER — FENTANYL CITRATE 50 UG/ML
50 INJECTION, SOLUTION INTRAMUSCULAR; INTRAVENOUS
Status: DISCONTINUED | OUTPATIENT
Start: 2020-03-06 | End: 2020-03-11 | Stop reason: HOSPADM

## 2020-03-06 RX ORDER — HEPARIN SODIUM 1000 [USP'U]/ML
4000 INJECTION, SOLUTION INTRAVENOUS; SUBCUTANEOUS PRN
Status: DISCONTINUED | OUTPATIENT
Start: 2020-03-06 | End: 2020-03-09

## 2020-03-06 RX ORDER — FENTANYL CITRATE 50 UG/ML
100 INJECTION, SOLUTION INTRAMUSCULAR; INTRAVENOUS ONCE
Status: COMPLETED | OUTPATIENT
Start: 2020-03-06 | End: 2020-03-06

## 2020-03-06 RX ORDER — SODIUM CHLORIDE 0.9 % (FLUSH) 0.9 %
10 SYRINGE (ML) INJECTION PRN
Status: DISCONTINUED | OUTPATIENT
Start: 2020-03-06 | End: 2020-03-11 | Stop reason: HOSPADM

## 2020-03-06 RX ORDER — LABETALOL HYDROCHLORIDE 5 MG/ML
20 INJECTION, SOLUTION INTRAVENOUS ONCE
Status: COMPLETED | OUTPATIENT
Start: 2020-03-06 | End: 2020-03-06

## 2020-03-06 RX ORDER — HEPARIN SODIUM 10000 [USP'U]/100ML
12 INJECTION, SOLUTION INTRAVENOUS CONTINUOUS
Status: DISCONTINUED | OUTPATIENT
Start: 2020-03-06 | End: 2020-03-06 | Stop reason: HOSPADM

## 2020-03-06 RX ORDER — HEPARIN SODIUM 10000 [USP'U]/100ML
7 INJECTION, SOLUTION INTRAVENOUS CONTINUOUS
Status: DISCONTINUED | OUTPATIENT
Start: 2020-03-06 | End: 2020-03-09

## 2020-03-06 RX ORDER — SODIUM CHLORIDE 0.9 % (FLUSH) 0.9 %
10 SYRINGE (ML) INJECTION EVERY 12 HOURS SCHEDULED
Status: DISCONTINUED | OUTPATIENT
Start: 2020-03-06 | End: 2020-03-11 | Stop reason: HOSPADM

## 2020-03-06 RX ORDER — SODIUM CHLORIDE 9 MG/ML
INJECTION, SOLUTION INTRAVENOUS CONTINUOUS
Status: DISCONTINUED | OUTPATIENT
Start: 2020-03-06 | End: 2020-03-08

## 2020-03-06 RX ORDER — LABETALOL 20 MG/4 ML (5 MG/ML) INTRAVENOUS SYRINGE
Status: COMPLETED
Start: 2020-03-06 | End: 2020-03-06

## 2020-03-06 RX ORDER — POLYETHYLENE GLYCOL 3350 17 G/17G
17 POWDER, FOR SOLUTION ORAL DAILY PRN
Status: DISCONTINUED | OUTPATIENT
Start: 2020-03-06 | End: 2020-03-11 | Stop reason: HOSPADM

## 2020-03-06 RX ORDER — ACETAMINOPHEN 325 MG/1
650 TABLET ORAL EVERY 6 HOURS PRN
Status: DISCONTINUED | OUTPATIENT
Start: 2020-03-06 | End: 2020-03-11 | Stop reason: HOSPADM

## 2020-03-06 RX ORDER — ALBUTEROL SULFATE 2.5 MG/3ML
2.5 SOLUTION RESPIRATORY (INHALATION) EVERY 4 HOURS
Status: DISCONTINUED | OUTPATIENT
Start: 2020-03-06 | End: 2020-03-06

## 2020-03-06 RX ORDER — PROMETHAZINE HYDROCHLORIDE 25 MG/1
12.5 TABLET ORAL EVERY 6 HOURS PRN
Status: DISCONTINUED | OUTPATIENT
Start: 2020-03-06 | End: 2020-03-11 | Stop reason: HOSPADM

## 2020-03-06 RX ORDER — LABETALOL 20 MG/4 ML (5 MG/ML) INTRAVENOUS SYRINGE
20 ONCE
Status: COMPLETED | OUTPATIENT
Start: 2020-03-06 | End: 2020-03-06

## 2020-03-06 RX ORDER — ASPIRIN 81 MG/1
243 TABLET, CHEWABLE ORAL ONCE
Status: COMPLETED | OUTPATIENT
Start: 2020-03-06 | End: 2020-03-06

## 2020-03-06 RX ORDER — ACETAMINOPHEN 650 MG/1
650 SUPPOSITORY RECTAL EVERY 6 HOURS PRN
Status: DISCONTINUED | OUTPATIENT
Start: 2020-03-06 | End: 2020-03-11 | Stop reason: HOSPADM

## 2020-03-06 RX ORDER — ALBUTEROL SULFATE 2.5 MG/3ML
2.5 SOLUTION RESPIRATORY (INHALATION) EVERY 4 HOURS PRN
Status: DISCONTINUED | OUTPATIENT
Start: 2020-03-06 | End: 2020-03-11 | Stop reason: HOSPADM

## 2020-03-06 RX ORDER — HEPARIN SODIUM 1000 [USP'U]/ML
2000 INJECTION, SOLUTION INTRAVENOUS; SUBCUTANEOUS PRN
Status: DISCONTINUED | OUTPATIENT
Start: 2020-03-06 | End: 2020-03-09

## 2020-03-06 RX ORDER — CLONIDINE HYDROCHLORIDE 0.1 MG/1
0.2 TABLET ORAL ONCE
Status: COMPLETED | OUTPATIENT
Start: 2020-03-06 | End: 2020-03-06

## 2020-03-06 RX ORDER — NICOTINE 21 MG/24HR
1 PATCH, TRANSDERMAL 24 HOURS TRANSDERMAL DAILY
Status: DISCONTINUED | OUTPATIENT
Start: 2020-03-07 | End: 2020-03-08

## 2020-03-06 RX ADMIN — IOPAMIDOL 100 ML: 755 INJECTION, SOLUTION INTRAVENOUS at 16:41

## 2020-03-06 RX ADMIN — FAMOTIDINE 20 MG: 20 TABLET, FILM COATED ORAL at 22:13

## 2020-03-06 RX ADMIN — ASPIRIN 81 MG 243 MG: 81 TABLET ORAL at 15:11

## 2020-03-06 RX ADMIN — LABETALOL HYDROCHLORIDE 20 MG: 5 INJECTION, SOLUTION INTRAVENOUS at 18:27

## 2020-03-06 RX ADMIN — LABETALOL 20 MG/4 ML (5 MG/ML) INTRAVENOUS SYRINGE 20 MG: at 16:08

## 2020-03-06 RX ADMIN — POTASSIUM BICARBONATE 40 MEQ: 782 TABLET, EFFERVESCENT ORAL at 16:07

## 2020-03-06 RX ADMIN — HEPARIN SODIUM 12 UNITS/KG/HR: 10000 INJECTION, SOLUTION INTRAVENOUS at 17:53

## 2020-03-06 RX ADMIN — HEPARIN SODIUM AND DEXTROSE 12 UNITS/KG/HR: 10000; 5 INJECTION INTRAVENOUS at 17:53

## 2020-03-06 RX ADMIN — LABETALOL 20 MG/4 ML (5 MG/ML) INTRAVENOUS SYRINGE 20 MG: at 15:12

## 2020-03-06 RX ADMIN — LABETALOL HYDROCHLORIDE 20 MG: 5 INJECTION, SOLUTION INTRAVENOUS at 15:12

## 2020-03-06 RX ADMIN — ACETAMINOPHEN 650 MG: 325 TABLET ORAL at 22:42

## 2020-03-06 RX ADMIN — SODIUM CHLORIDE, PRESERVATIVE FREE 10 ML: 5 INJECTION INTRAVENOUS at 22:14

## 2020-03-06 RX ADMIN — FENTANYL CITRATE 50 MCG: 50 INJECTION, SOLUTION INTRAMUSCULAR; INTRAVENOUS at 22:41

## 2020-03-06 RX ADMIN — ALBUTEROL SULFATE 2.5 MG: 2.5 SOLUTION RESPIRATORY (INHALATION) at 22:47

## 2020-03-06 RX ADMIN — FENTANYL CITRATE 100 MCG: 50 INJECTION INTRAMUSCULAR; INTRAVENOUS at 18:27

## 2020-03-06 RX ADMIN — CLONIDINE HYDROCHLORIDE 0.2 MG: 0.1 TABLET ORAL at 17:53

## 2020-03-06 RX ADMIN — SODIUM CHLORIDE: 9 INJECTION, SOLUTION INTRAVENOUS at 22:04

## 2020-03-06 ASSESSMENT — PAIN DESCRIPTION - LOCATION: LOCATION: CHEST

## 2020-03-06 ASSESSMENT — PAIN DESCRIPTION - FREQUENCY: FREQUENCY: CONTINUOUS

## 2020-03-06 ASSESSMENT — PAIN DESCRIPTION - PAIN TYPE: TYPE: ACUTE PAIN

## 2020-03-06 ASSESSMENT — PAIN SCALES - GENERAL
PAINLEVEL_OUTOF10: 3
PAINLEVEL_OUTOF10: 4
PAINLEVEL_OUTOF10: 4
PAINLEVEL_OUTOF10: 0
PAINLEVEL_OUTOF10: 0

## 2020-03-06 ASSESSMENT — PAIN DESCRIPTION - ONSET: ONSET: ON-GOING

## 2020-03-06 ASSESSMENT — PAIN DESCRIPTION - PROGRESSION: CLINICAL_PROGRESSION: NOT CHANGED

## 2020-03-06 ASSESSMENT — PAIN DESCRIPTION - DESCRIPTORS: DESCRIPTORS: ACHING

## 2020-03-06 ASSESSMENT — PULMONARY FUNCTION TESTS: PIF_VALUE: 25

## 2020-03-06 NOTE — PROGRESS NOTES
Patient brought in by squad. Within minutes family and friends were present. Patient has three daughters. They were present. When inquired about a spouse, was told they are .  was able get family and others in to see patient, a few at a time. Once patient was stable, the daughters were able to stay with him and then others could take turns. Directions and a transfer bag were given. Family may need HA.

## 2020-03-06 NOTE — ED PROVIDER NOTES
[address] COMPLAINT    Chief Complaint   Patient presents with    Cardiac Arrest     found unresponsive at work and first responders began CPR and AED shocked pt one time. Pt now alert and oriented. ARCADIO Arnold is a 64 y.o. male who presentsto ED from work. By EMS. With complaint of unresponsive patient. Onset PTA. Patient was found unresponsive at work. Patient was found  down on the floor. Chest compressions were immediately started. AED [place] shockable rhythm. Patient was shocked one time, ROSC after defibrillation. Is alert and oriented x3 in ED. He has tennis balls and respiration. Patient is hypertensive. Complains of right sided rib pain  Patient denies chest pains prior to the event. Patient denies change of his medications. She has history of hypertension but no other history of cardiovascular disease. Patient is morbidly obese. Patient is a smoker. PAST MEDICAL HISTORY    Past Medical History:   Diagnosis Date    Alcoholism in recovery [facility])     Report last drink was in 1980    Hypertension     REGIS (obstructive sleep apnea)     BiPap at home       SURGICAL HISTORY    History reviewed. No pertinent surgical history. CURRENT MEDICATIONS        ALLERGIES    No Known Allergies    FAMILY HISTORY    History reviewed. No pertinent family history.     SOCIAL HISTORY    Social History     Socioeconomic History    Marital status:      Spouse name: None    Number of children: None    Years of education: None    Highest education level: None   Occupational History    None   Social Needs    Financial resource strain: None    Food insecurity     Worry: None     Inability: None    Transportation needs     Medical: None     Non-medical: None   Tobacco Use    Smoking status: Current Every Day Smoker     Packs/day: 1.00     Years: 40.00     Pack years: 40.00     Types: Cigarettes    Smokeless tobacco: Never Used   Substance and labetalol for his hypertension. Patient will transferred to [facility]. Patient is discussed with Dr. Mikki Sandoval. Prior to the transfer patient awake and alert. Chest pain free. Patient has multiple risk factors for CAD. Patient is a smoker. Patient is obese and has hypertension. ED Medications administered this visit:    Medications   aspirin chewable tablet 243 mg (243 mg Oral Given 3/6/20 1511)   labetalol (NORMODYNE;TRANDATE) injection 20 mg (20 mg Intravenous Given 3/6/20 1512)   potassium bicarb-citric acid (EFFER-K) effervescent tablet 40 mEq (40 mEq Oral Given 3/6/20 1607)   labetalol (NORMODYNE;TRANDATE) injection syringe 20 mg (20 mg Intravenous Given 3/6/20 1608)   iopamidol (ISOVUE-370) 76 % injection 100 mL (100 mLs Intravenous Given 3/6/20 1641)   cloNIDine (CATAPRES) tablet 0.2 mg (0.2 mg Oral Given 3/6/20 1753)   fentaNYL (SUBLIMAZE) injection 100 mcg (100 mcg Intravenous Given 3/6/20 1827)   labetalol (NORMODYNE;TRANDATE) injection 20 mg (20 mg Intravenous Given 3/6/20 1827)       New Prescriptions from this visit:    Discharge Medication List as of 3/6/2020  7:09 PM          Follow-up:  No follow-up provider specified. Final Impression:   1. Syncope and collapse    2.  Cardiac arrest [facility])               (Please note that portions of this note were completed with a voice recognition program.  Efforts were made to edit the dictations but occasionally words are mis-transcribed.)        Alan Rogers MD  03/07/20 5620       Alan Rogers MD  03/07/20 8638

## 2020-03-07 PROBLEM — E66.01 MORBID OBESITY (HCC): Status: ACTIVE | Noted: 2020-03-07

## 2020-03-07 PROBLEM — I10 HYPERTENSION: Status: ACTIVE | Noted: 2020-03-07

## 2020-03-07 PROBLEM — F17.210 DEPENDENCE ON NICOTINE FROM CIGARETTES: Status: ACTIVE | Noted: 2020-03-07

## 2020-03-07 PROBLEM — I21.4 NSTEMI (NON-ST ELEVATED MYOCARDIAL INFARCTION) (HCC): Status: ACTIVE | Noted: 2020-03-07

## 2020-03-07 LAB
ABSOLUTE EOS #: 0.1 K/UL (ref 0–0.44)
ABSOLUTE IMMATURE GRANULOCYTE: 0.07 K/UL (ref 0–0.3)
ABSOLUTE LYMPH #: 1.63 K/UL (ref 1.1–3.7)
ABSOLUTE MONO #: 0.98 K/UL (ref 0.1–1.2)
ADENOVIRUS PCR: NOT DETECTED
ALBUMIN SERPL-MCNC: 3.8 G/DL (ref 3.5–5.2)
ALBUMIN/GLOBULIN RATIO: 1.4 (ref 1–2.5)
ALP BLD-CCNC: 62 U/L (ref 40–129)
ALT SERPL-CCNC: 53 U/L (ref 5–41)
AMPHETAMINE SCREEN URINE: NEGATIVE
ANION GAP SERPL CALCULATED.3IONS-SCNC: 16 MMOL/L (ref 9–17)
AST SERPL-CCNC: 38 U/L
BARBITURATE SCREEN URINE: NEGATIVE
BASOPHILS # BLD: 1 % (ref 0–2)
BASOPHILS ABSOLUTE: 0.08 K/UL (ref 0–0.2)
BENZODIAZEPINE SCREEN, URINE: NEGATIVE
BILIRUB SERPL-MCNC: 0.48 MG/DL (ref 0.3–1.2)
BORDETELLA PARAPERTUSSIS: NOT DETECTED
BORDETELLA PERTUSSIS PCR: NOT DETECTED
BUN BLDV-MCNC: 18 MG/DL (ref 6–20)
BUN/CREAT BLD: ABNORMAL (ref 9–20)
BUPRENORPHINE URINE: NORMAL
C-REACTIVE PROTEIN: 10.3 MG/L (ref 0–5)
CALCIUM IONIZED: 1.13 MMOL/L (ref 1.13–1.33)
CALCIUM SERPL-MCNC: 9.2 MG/DL (ref 8.6–10.4)
CANNABINOID SCREEN URINE: NEGATIVE
CHLAMYDIA PNEUMONIAE BY PCR: NOT DETECTED
CHLORIDE BLD-SCNC: 102 MMOL/L (ref 98–107)
CO2: 22 MMOL/L (ref 20–31)
COCAINE METABOLITE, URINE: NEGATIVE
CORONAVIRUS 229E PCR: NOT DETECTED
CORONAVIRUS HKU1 PCR: NOT DETECTED
CORONAVIRUS NL63 PCR: NOT DETECTED
CORONAVIRUS OC43 PCR: NOT DETECTED
CREAT SERPL-MCNC: 0.86 MG/DL (ref 0.7–1.2)
DIFFERENTIAL TYPE: ABNORMAL
DIRECT EXAM: NORMAL
EKG ATRIAL RATE: 101 BPM
EKG ATRIAL RATE: 59 BPM
EKG ATRIAL RATE: 61 BPM
EKG ATRIAL RATE: 74 BPM
EKG P AXIS: 10 DEGREES
EKG P AXIS: 62 DEGREES
EKG P AXIS: 63 DEGREES
EKG P-R INTERVAL: 162 MS
EKG P-R INTERVAL: 174 MS
EKG P-R INTERVAL: 174 MS
EKG Q-T INTERVAL: 384 MS
EKG Q-T INTERVAL: 414 MS
EKG Q-T INTERVAL: 438 MS
EKG Q-T INTERVAL: 462 MS
EKG QRS DURATION: 114 MS
EKG QRS DURATION: 116 MS
EKG QRS DURATION: 120 MS
EKG QRS DURATION: 122 MS
EKG QTC CALCULATION (BAZETT): 440 MS
EKG QTC CALCULATION (BAZETT): 457 MS
EKG QTC CALCULATION (BAZETT): 459 MS
EKG QTC CALCULATION (BAZETT): 500 MS
EKG R AXIS: -13 DEGREES
EKG R AXIS: 15 DEGREES
EKG R AXIS: 16 DEGREES
EKG R AXIS: 26 DEGREES
EKG T AXIS: 18 DEGREES
EKG T AXIS: 32 DEGREES
EKG T AXIS: 36 DEGREES
EKG T AXIS: 64 DEGREES
EKG VENTRICULAR RATE: 102 BPM
EKG VENTRICULAR RATE: 59 BPM
EKG VENTRICULAR RATE: 61 BPM
EKG VENTRICULAR RATE: 74 BPM
EOSINOPHILS RELATIVE PERCENT: 1 % (ref 1–4)
GFR AFRICAN AMERICAN: >60 ML/MIN
GFR NON-AFRICAN AMERICAN: >60 ML/MIN
GFR SERPL CREATININE-BSD FRML MDRD: ABNORMAL ML/MIN/{1.73_M2}
GFR SERPL CREATININE-BSD FRML MDRD: ABNORMAL ML/MIN/{1.73_M2}
GLUCOSE BLD-MCNC: 124 MG/DL (ref 70–99)
HCT VFR BLD CALC: 47.1 % (ref 40.7–50.3)
HEMOGLOBIN: 15.4 G/DL (ref 13–17)
HUMAN METAPNEUMOVIRUS PCR: NOT DETECTED
IMMATURE GRANULOCYTES: 1 %
INFLUENZA A BY PCR: NOT DETECTED
INFLUENZA A H1 (2009) PCR: NORMAL
INFLUENZA A H1 PCR: NORMAL
INFLUENZA A H3 PCR: NORMAL
INFLUENZA B BY PCR: NOT DETECTED
INR BLD: 1.1
LV EF: 48 %
LVEF MODALITY: NORMAL
LYMPHOCYTES # BLD: 13 % (ref 24–43)
Lab: NORMAL
MAGNESIUM: 2.3 MG/DL (ref 1.6–2.6)
MCH RBC QN AUTO: 30.4 PG (ref 25.2–33.5)
MCHC RBC AUTO-ENTMCNC: 32.7 G/DL (ref 28.4–34.8)
MCV RBC AUTO: 93.1 FL (ref 82.6–102.9)
MDMA URINE: NORMAL
METHADONE SCREEN, URINE: NEGATIVE
METHAMPHETAMINE, URINE: NORMAL
MONOCYTES # BLD: 8 % (ref 3–12)
MRSA, DNA, NASAL: NORMAL
MYCOPLASMA PNEUMONIAE PCR: NOT DETECTED
NRBC AUTOMATED: 0 PER 100 WBC
OPIATES, URINE: NEGATIVE
OXYCODONE SCREEN URINE: NEGATIVE
PARAINFLUENZA 1 PCR: NOT DETECTED
PARAINFLUENZA 2 PCR: NOT DETECTED
PARAINFLUENZA 3 PCR: NOT DETECTED
PARAINFLUENZA 4 PCR: NOT DETECTED
PARTIAL THROMBOPLASTIN TIME: 34.4 SEC (ref 20.5–30.5)
PARTIAL THROMBOPLASTIN TIME: 41.9 SEC (ref 20.5–30.5)
PARTIAL THROMBOPLASTIN TIME: 49.3 SEC (ref 20.5–30.5)
PARTIAL THROMBOPLASTIN TIME: 57.8 SEC (ref 20.5–30.5)
PDW BLD-RTO: 14 % (ref 11.8–14.4)
PHENCYCLIDINE, URINE: NEGATIVE
PHOSPHORUS: 3.8 MG/DL (ref 2.5–4.5)
PLATELET # BLD: 231 K/UL (ref 138–453)
PLATELET ESTIMATE: ABNORMAL
PMV BLD AUTO: 11.1 FL (ref 8.1–13.5)
POTASSIUM SERPL-SCNC: 3.7 MMOL/L (ref 3.7–5.3)
PROPOXYPHENE, URINE: NORMAL
PROTHROMBIN TIME: 11.1 SEC (ref 9–12)
RBC # BLD: 5.06 M/UL (ref 4.21–5.77)
RBC # BLD: ABNORMAL 10*6/UL
RESP SYNCYTIAL VIRUS PCR: NOT DETECTED
RHINO/ENTEROVIRUS PCR: NOT DETECTED
SEDIMENTATION RATE, ERYTHROCYTE: 2 MM (ref 0–10)
SEG NEUTROPHILS: 76 % (ref 36–65)
SEGMENTED NEUTROPHILS ABSOLUTE COUNT: 9.4 K/UL (ref 1.5–8.1)
SODIUM BLD-SCNC: 140 MMOL/L (ref 135–144)
SPECIMEN DESCRIPTION: NORMAL
TEST INFORMATION: NORMAL
TOTAL PROTEIN: 6.5 G/DL (ref 6.4–8.3)
TRICYCLIC ANTIDEPRESSANTS, UR: NORMAL
TROPONIN INTERP: ABNORMAL
TROPONIN T: ABNORMAL NG/ML
TROPONIN, HIGH SENSITIVITY: 121 NG/L (ref 0–22)
TROPONIN, HIGH SENSITIVITY: 137 NG/L (ref 0–22)
TROPONIN, HIGH SENSITIVITY: 156 NG/L (ref 0–22)
TROPONIN, HIGH SENSITIVITY: 156 NG/L (ref 0–22)
TROPONIN, HIGH SENSITIVITY: 157 NG/L (ref 0–22)
TROPONIN, HIGH SENSITIVITY: 163 NG/L (ref 0–22)
WBC # BLD: 12.3 K/UL (ref 3.5–11.3)
WBC # BLD: ABNORMAL 10*3/UL

## 2020-03-07 PROCEDURE — 97166 OT EVAL MOD COMPLEX 45 MIN: CPT | Performed by: OCCUPATIONAL THERAPIST

## 2020-03-07 PROCEDURE — 6370000000 HC RX 637 (ALT 250 FOR IP): Performed by: STUDENT IN AN ORGANIZED HEALTH CARE EDUCATION/TRAINING PROGRAM

## 2020-03-07 PROCEDURE — 6360000002 HC RX W HCPCS: Performed by: STUDENT IN AN ORGANIZED HEALTH CARE EDUCATION/TRAINING PROGRAM

## 2020-03-07 PROCEDURE — 2580000003 HC RX 258: Performed by: STUDENT IN AN ORGANIZED HEALTH CARE EDUCATION/TRAINING PROGRAM

## 2020-03-07 PROCEDURE — 93005 ELECTROCARDIOGRAM TRACING: CPT | Performed by: STUDENT IN AN ORGANIZED HEALTH CARE EDUCATION/TRAINING PROGRAM

## 2020-03-07 PROCEDURE — 85610 PROTHROMBIN TIME: CPT

## 2020-03-07 PROCEDURE — 93010 ELECTROCARDIOGRAM REPORT: CPT | Performed by: INTERNAL MEDICINE

## 2020-03-07 PROCEDURE — 97535 SELF CARE MNGMENT TRAINING: CPT | Performed by: OCCUPATIONAL THERAPIST

## 2020-03-07 PROCEDURE — 84484 ASSAY OF TROPONIN QUANT: CPT

## 2020-03-07 PROCEDURE — 86140 C-REACTIVE PROTEIN: CPT

## 2020-03-07 PROCEDURE — 83735 ASSAY OF MAGNESIUM: CPT

## 2020-03-07 PROCEDURE — 99291 CRITICAL CARE FIRST HOUR: CPT | Performed by: INTERNAL MEDICINE

## 2020-03-07 PROCEDURE — 97530 THERAPEUTIC ACTIVITIES: CPT

## 2020-03-07 PROCEDURE — 85025 COMPLETE CBC W/AUTO DIFF WBC: CPT

## 2020-03-07 PROCEDURE — 2000000000 HC ICU R&B

## 2020-03-07 PROCEDURE — 94660 CPAP INITIATION&MGMT: CPT

## 2020-03-07 PROCEDURE — 84100 ASSAY OF PHOSPHORUS: CPT

## 2020-03-07 PROCEDURE — 80053 COMPREHEN METABOLIC PANEL: CPT

## 2020-03-07 PROCEDURE — 94640 AIRWAY INHALATION TREATMENT: CPT

## 2020-03-07 PROCEDURE — 82330 ASSAY OF CALCIUM: CPT

## 2020-03-07 PROCEDURE — 36415 COLL VENOUS BLD VENIPUNCTURE: CPT

## 2020-03-07 PROCEDURE — 85730 THROMBOPLASTIN TIME PARTIAL: CPT

## 2020-03-07 PROCEDURE — 2700000000 HC OXYGEN THERAPY PER DAY

## 2020-03-07 PROCEDURE — 97161 PT EVAL LOW COMPLEX 20 MIN: CPT

## 2020-03-07 PROCEDURE — 94761 N-INVAS EAR/PLS OXIMETRY MLT: CPT

## 2020-03-07 PROCEDURE — 93306 TTE W/DOPPLER COMPLETE: CPT

## 2020-03-07 RX ORDER — ASPIRIN 81 MG/1
81 TABLET, CHEWABLE ORAL DAILY
Status: DISCONTINUED | OUTPATIENT
Start: 2020-03-07 | End: 2020-03-09

## 2020-03-07 RX ORDER — ATORVASTATIN CALCIUM 40 MG/1
40 TABLET, FILM COATED ORAL NIGHTLY
Status: DISCONTINUED | OUTPATIENT
Start: 2020-03-07 | End: 2020-03-11 | Stop reason: HOSPADM

## 2020-03-07 RX ADMIN — SODIUM CHLORIDE, PRESERVATIVE FREE 10 ML: 5 INJECTION INTRAVENOUS at 08:22

## 2020-03-07 RX ADMIN — FAMOTIDINE 20 MG: 20 TABLET, FILM COATED ORAL at 20:39

## 2020-03-07 RX ADMIN — FENTANYL CITRATE 50 MCG: 50 INJECTION, SOLUTION INTRAMUSCULAR; INTRAVENOUS at 18:21

## 2020-03-07 RX ADMIN — SODIUM CHLORIDE: 9 INJECTION, SOLUTION INTRAVENOUS at 17:44

## 2020-03-07 RX ADMIN — ASPIRIN 81 MG: 81 TABLET, CHEWABLE ORAL at 09:18

## 2020-03-07 RX ADMIN — FENTANYL CITRATE 50 MCG: 50 INJECTION, SOLUTION INTRAMUSCULAR; INTRAVENOUS at 04:18

## 2020-03-07 RX ADMIN — IPRATROPIUM BROMIDE AND ALBUTEROL SULFATE 1 AMPULE: .5; 3 SOLUTION RESPIRATORY (INHALATION) at 20:22

## 2020-03-07 RX ADMIN — HEPARIN SODIUM 2000 UNITS: 1000 INJECTION INTRAVENOUS; SUBCUTANEOUS at 01:15

## 2020-03-07 RX ADMIN — HEPARIN SODIUM 2000 UNITS: 1000 INJECTION INTRAVENOUS; SUBCUTANEOUS at 13:22

## 2020-03-07 RX ADMIN — IPRATROPIUM BROMIDE AND ALBUTEROL SULFATE 1 AMPULE: .5; 3 SOLUTION RESPIRATORY (INHALATION) at 08:09

## 2020-03-07 RX ADMIN — IPRATROPIUM BROMIDE AND ALBUTEROL SULFATE 1 AMPULE: .5; 3 SOLUTION RESPIRATORY (INHALATION) at 12:58

## 2020-03-07 RX ADMIN — HEPARIN SODIUM 18 UNITS/KG/HR: 10000 INJECTION, SOLUTION INTRAVENOUS at 17:40

## 2020-03-07 RX ADMIN — DESMOPRESSIN ACETATE 40 MG: 0.2 TABLET ORAL at 20:39

## 2020-03-07 RX ADMIN — IPRATROPIUM BROMIDE AND ALBUTEROL SULFATE 1 AMPULE: .5; 3 SOLUTION RESPIRATORY (INHALATION) at 15:42

## 2020-03-07 RX ADMIN — FENTANYL CITRATE 50 MCG: 50 INJECTION, SOLUTION INTRAMUSCULAR; INTRAVENOUS at 07:17

## 2020-03-07 RX ADMIN — HEPARIN SODIUM 14 UNITS/KG/HR: 10000 INJECTION, SOLUTION INTRAVENOUS at 06:51

## 2020-03-07 RX ADMIN — FENTANYL CITRATE 50 MCG: 50 INJECTION, SOLUTION INTRAMUSCULAR; INTRAVENOUS at 15:10

## 2020-03-07 RX ADMIN — SODIUM CHLORIDE, PRESERVATIVE FREE 10 ML: 5 INJECTION INTRAVENOUS at 20:39

## 2020-03-07 RX ADMIN — FAMOTIDINE 20 MG: 20 TABLET, FILM COATED ORAL at 08:18

## 2020-03-07 RX ADMIN — HEPARIN SODIUM 2000 UNITS: 1000 INJECTION INTRAVENOUS; SUBCUTANEOUS at 07:12

## 2020-03-07 ASSESSMENT — PAIN SCALES - GENERAL
PAINLEVEL_OUTOF10: 1
PAINLEVEL_OUTOF10: 2
PAINLEVEL_OUTOF10: 0
PAINLEVEL_OUTOF10: 4
PAINLEVEL_OUTOF10: 2
PAINLEVEL_OUTOF10: 4
PAINLEVEL_OUTOF10: 4

## 2020-03-07 ASSESSMENT — PAIN DESCRIPTION - LOCATION: LOCATION: CHEST

## 2020-03-07 ASSESSMENT — PAIN DESCRIPTION - FREQUENCY: FREQUENCY: CONTINUOUS

## 2020-03-07 ASSESSMENT — PULMONARY FUNCTION TESTS
PIF_VALUE: 20

## 2020-03-07 ASSESSMENT — PAIN DESCRIPTION - PROGRESSION: CLINICAL_PROGRESSION: GRADUALLY IMPROVING

## 2020-03-07 ASSESSMENT — PAIN DESCRIPTION - PAIN TYPE: TYPE: ACUTE PAIN

## 2020-03-07 ASSESSMENT — PAIN DESCRIPTION - ORIENTATION: ORIENTATION: MID

## 2020-03-07 ASSESSMENT — PAIN DESCRIPTION - DESCRIPTORS: DESCRIPTORS: ACHING

## 2020-03-07 ASSESSMENT — PAIN DESCRIPTION - ONSET: ONSET: ON-GOING

## 2020-03-07 ASSESSMENT — PAIN - FUNCTIONAL ASSESSMENT: PAIN_FUNCTIONAL_ASSESSMENT: ACTIVITIES ARE NOT PREVENTED

## 2020-03-07 NOTE — PROGRESS NOTES
Stated Pain Goal: No pain  Pain Type: Acute pain  Pain Location: Chest(s/p chest compressions per pt)  Pain Orientation: Mid  Pain Descriptors: Aching  Pain Frequency: Continuous  Pain Onset: On-going  Clinical Progression: Gradually improving(pt states his pain meds are helping)  Functional Pain Assessment: Activities are not prevented  Vital Signs  Patient Currently in Pain: Yes  Pre Treatment Pain Screening  Intervention List: Patient able to continue with treatment    Orientation  Orientation  Overall Orientation Status: Within Normal Limits  Social/Functional History  Social/Functional History  Lives With: Alone  Type of Home: Apartment  Home Layout: Two level(bed/bath upstairs)  Home Access: Stairs to enter without rails  Entrance Stairs - Number of Steps: 1 very small step to enter the building  ADL Assistance: Independent  Ambulation Assistance: Independent  Transfer Assistance: Independent  Active : Yes  Occupation: Full time employment    Objective     Observation/Palpation  Posture: Good    AROM RLE (degrees)  RLE AROM: WFL  AROM LLE (degrees)  LLE AROM : WFL  AROM RUE (degrees)  RUE AROM : WFL  AROM LUE (degrees)  LUE AROM : WFL  Strength RLE  Strength RLE: WFL  Strength LLE  Strength LLE: WFL  Strength RUE  Strength RUE: WFL  Strength LUE  Strength LUE: WFL  Tone RLE  RLE Tone: Normotonic  Tone LLE  LLE Tone: Normotonic  Motor Control  Gross Motor?: WFL  Sensation  Overall Sensation Status: WFL  Bed mobility  Rolling to Right: Modified independent  Supine to Sit: Modified independent  Scooting: Independent  Transfers  Sit to Stand: Supervision  Stand to sit: Supervision  Bed to Chair: Supervision  Stand Pivot Transfers: Supervision  Ambulation  Ambulation?: Yes  Ambulation 1  Surface: level tile  Device: No Device  Assistance: Supervision  Gait Deviations: Increased JOB; Slow Melanie;Decreased step length  Distance: bed to chair--~8 steps  Stairs/Curb  Stairs?: No     Balance  Posture:

## 2020-03-07 NOTE — PROGRESS NOTES
[facility]azmín Brambila, [facility]. Cardiac arrest ([facility] [address] [I46.9] . There were no vitals filed for this visit. . Patients home meds are   Prior to Admission medications    Medication Sig Start Date End Date Taking? Authorizing Provider   albuterol sulfate  (90 Base) MCG/ACT inhaler Inhale 1-2 puffs every 6 hrs as needed for wheezing or shortness of breath.  4/15/19   Carline Spatz, APRN - CNP   terazosin (HYTRIN) 5 MG capsule Take 5 mg by mouth nightly    Historical Provider, MD   lisinopril (PRINIVIL;ZESTRIL) 10 MG tablet Take 20 mg by mouth daily     Historical Provider, MD   .      Assessment   RR 18  Breath Sounds: expiratory wheezes      · Bronchodilator assessment at level  3  ·   · [x]    Bronchodilator Assessment  BRONCHODILATOR ASSESSMENT SCORE  Score 0 1 2 3 4 5   Breath Sounds   []  Patient Baseline []  No Wheeze good aeration []  Faint, scattered wheezing, good aeration [x]  Expiratory Wheezing and or moderately diminished []  Insp/Exp wheeze and/or very diminished []  Insp/Exp and/ or marked distress   Respiratory Rate   []  Patient Baseline []  Less than 20 [x]  Less than 20 []  20-25 []  Greater than 25 []  Greater than 25   Peak flow % of Pred or PB [x]  NA   []  Greater than 90%  []  81-90% []  71-80% []  Less than or equal to 70%  or unable to perform []  Unable due to Respiratory Distress   Dyspnea re []  Patient Baseline []  No SOB []  No SOB [x]  SOB on exertion []  SOB min activity []  At rest/acute   e FEV% Predicted       [x]  NA []  Above 69%  []  Unable []  Above 60-69%  []  Unable []  Above 50-59%  []  Unable []  Above 35-49%  []  Unable []  Less than 35%  []  Unable

## 2020-03-07 NOTE — PROGRESS NOTES
Pt arrived on heparin gtt of 12mg/kg/hr based on estimated weight of 140kg. Dr Adelso Abraham aware. Verbal order received to continue heparin gtt at this time until he can consult cardiology and lab work can be repeated.

## 2020-03-07 NOTE — PROGRESS NOTES
143.4     Wt Readings from Last 3 Encounters:   03/06/20 (!) 316 lb 2.2 oz (143.4 kg)   03/06/20 (!) 310 lb (140.6 kg)   04/15/19 (!) 338 lb (153.3 kg)     Body mass index is 44.09 kg/m². PHYSICAL EXAM:  Constitutional: AAO x3  HEENT: PERRLA, EOMI, sclera clear, anicteric  Respiratory: clear to auscultation, no wheezes or rales and unlabored breathing. Cardiovascular: regular rate and rhythm, normal S1, S2, no murmur noted and 2+ pulses throughout  Abdomen: soft, nontender, nondistended, no masses or organomegaly  NEUROLOGIC: Awake, alert, oriented to name, place and time. Cranial nerves II-XII are grossly intact. Motor is 5 out of 5 bilaterally. Sensory is intact. Extremities:  peripheral pulses normal, no pedal edema,.       Any additional physical findings:      MEDICATIONS:  Scheduled Meds:   aspirin  81 mg Oral Daily    atorvastatin  40 mg Oral Nightly    sodium chloride flush  10 mL Intravenous 2 times per day    famotidine  20 mg Oral BID    nicotine  1 patch Transdermal Daily    ipratropium-albuterol  1 ampule Inhalation 4x daily    heparin (porcine)  4,000 Units Intravenous Once     Continuous Infusions:   sodium chloride 50 mL/hr at 03/06/20 2204    heparin (porcine) 14 Units/kg/hr (03/07/20 0115)     PRN Meds:   sodium chloride flush, 10 mL, PRN  acetaminophen, 650 mg, Q6H PRN    Or  acetaminophen, 650 mg, Q6H PRN  polyethylene glycol, 17 g, Daily PRN  promethazine, 12.5 mg, Q6H PRN    Or  ondansetron, 4 mg, Q6H PRN  potassium chloride, 10 mEq, PRN  magnesium sulfate, 2 g, PRN  albuterol, 2.5 mg, Q4H PRN  fentanNYL, 50 mcg, Q2H PRN  heparin (porcine), 4,000 Units, PRN  heparin (porcine), 2,000 Units, PRN        SUPPORT DEVICES: [] Ventilator [] BIPAP  [] Nasal Cannula [x] Room Air    VENT SETTINGS (Comprehensive) (if applicable):  Vent Information  Vent Type: Servo i  Vent Mode: NIV/PC  Pressure Ordered: 5  Rate Set: 12 bmp  FiO2 : 30 %  PEEP/CPAP: 15  I Time/ I Time %: 0.9 s  Additional Respiratory  Assessments  Pulse: 57  Resp: 15  SpO2: 96 %  Position: Semi-Byers's  Skin barrier applied: Yes    ABGs:     Lab Results   Component Value Date    JNE9VYH 32 03/06/2020    FIO2 NOT REPORTED 03/06/2020     Lactic Acid:   Lab Results   Component Value Date    LACTA NOT REPORTED 03/06/2020         DATA:  Complete Blood Count:   Recent Labs     03/06/20  1510 03/07/20  0524   WBC 13.5* 12.3*   HGB 16.6 15.4   MCV 89.0 93.1    231   RBC 5.57 5.06   HCT 49.5 47.1   MCH 29.8 30.4   MCHC 33.5 32.7   RDW 14.2 14.0   MPV NOT REPORTED 11.1        PT/INR:    Lab Results   Component Value Date    PROTIME 11.1 03/07/2020    INR 1.1 03/07/2020     PTT:    Lab Results   Component Value Date    APTT 41.9 03/07/2020       Basal Metabolic Profile:   Recent Labs     03/06/20  1510 03/07/20  0524    140   K 3.4* 3.7   BUN 15 18   CREATININE 1.04 0.86   CL 99 102   CO2 26 22      Magnesium:   Lab Results   Component Value Date    MG 2.3 03/07/2020    MG 2.3 03/06/2020     Phosphorus:   Lab Results   Component Value Date    PHOS 3.8 03/07/2020    PHOS 3.7 03/06/2020     S. Calcium:  Recent Labs     03/07/20  0524   CALCIUM 9.2       LFTS  Recent Labs     03/06/20  1510 03/07/20  0524   ALKPHOS 83 62   ALT 66* 53*   AST 80* 38   BILITOT 0.35 0.48   LABALBU 4.5 3.8       AMYLASE/LIPASE/AMMONIA  No results for input(s): AMYLASE, LIPASE, AMMONIA in the last 72 hours. Last 3 Blood Glucose:   Recent Labs     03/06/20  1510 03/07/20  0524   GLUCOSE 173* 124*      HgBA1c:  No results found for: LABA1C      TSH:  No results found for: TSH  ANEMIA STUDIES  No results for input(s): LABIRON, TIBC, FERRITIN, [id number], FOLATE, OCCULTBLD in the last 72 hours.       Cultures during this admission:     Blood cultures:                 [] None drawn      [] Negative             []  Positive (Details:  )  Urine Culture:                   [] None drawn      [] Negative             []  Positive (Details:  )  Sputum Culture:

## 2020-03-07 NOTE — PLAN OF CARE
Problem: Cardiac:  Goal: Ability to maintain vital signs within normal range will improve  Description: Ability to maintain vital signs within normal range will improve  3/7/2020 0934 by Koffi Jacques RN  Outcome: Ongoing  3/7/2020 0401 by Jasmyn Rosario  Outcome: Ongoing     Problem: Cardiac:  Goal: Cardiovascular alteration will improve  Description: Cardiovascular alteration will improve  3/7/2020 0934 by Koffi Jacques RN  Outcome: Ongoing  3/7/2020 0401 by Jasmyn Rosario  Outcome: Ongoing     Problem: Health Behavior:  Goal: Will modify at least one risk factor affecting health status  Description: Will modify at least one risk factor affecting health status  3/7/2020 0934 by Koffi Jacques RN  Outcome: Ongoing  3/7/2020 0401 by Jasmyn Rosario  Outcome: Ongoing     Problem: Health Behavior:  Goal: Identification of resources available to assist in meeting health care needs will improve  Description: Identification of resources available to assist in meeting health care needs will improve  3/7/2020 0934 by Koffi Jacques RN  Outcome: Ongoing  3/7/2020 0401 by Jasmyn Rosario  Outcome: Ongoing     Problem: Physical Regulation:  Goal: Complications related to the disease process, condition or treatment will be avoided or minimized  Description: Complications related to the disease process, condition or treatment will be avoided or minimized  3/7/2020 0934 by Koffi Jacques RN  Outcome: Ongoing  3/7/2020 0401 by Jasmyn Rosario  Outcome: Ongoing     Problem: Anxiety/Stress:  Goal: Level of anxiety will decrease  Description: Level of anxiety will decrease  3/7/2020 0934 by Koffi Jacquse RN  Outcome: Ongoing  3/7/2020 0401 by Jasmyn Rosario  Outcome: Ongoing     Problem: Cardiac Output - Decreased:  Goal: Hemodynamic stability will improve  Description: Hemodynamic stability will improve  3/7/2020 0934 by Koffi Jacques RN  Outcome: Ongoing  3/7/2020 0401 by Chucky Daniel Asymptomatic.

## 2020-03-07 NOTE — CONSULTS
change in energy level, No change in activity level. · Eyes: No visual changes or diplopia. No scleral icterus. · ENT: No Headaches, hearing loss or vertigo. No mouth sores or sore throat. · Cardiovascular: As HPI  · Respiratory: As HPI  · Gastrointestinal: No abdominal pain, appetite loss, blood in stools. No change in bowel or bladder habits. · Genitourinary: No dysuria, trouble voiding, or hematuria. · Musculoskeletal:  No gait disturbance, No weakness or joint complaints. · Integumentary: No rash or pruritis. · Neurological: No headache, diplopia, change in muscle strength, numbness or tingling. No change in gait, balance, coordination, mood, affect, memory, mentation, behavior. · Psychiatric: No anxiety, or depression. · Endocrine: No temperature intolerance. No excessive thirst, fluid intake, or urination. No tremor. · Hematologic/Lymphatic: No abnormal bruising or bleeding, blood clots or swollen lymph nodes. · Allergic/Immunologic: No nasal congestion or hives. PHYSICAL EXAM:    Physical Examination:    /70   Pulse 57   Temp 98.1 °F (36.7 °C) (Oral)   Resp 16   Ht 5' 11\" (1.803 m)   Wt (!) 317 lb 9.6 oz (144.1 kg)   SpO2 100%   BMI 44.30 kg/m²    Constitutional and General Appearance: alert, cooperative, no distress and appears stated age  HEENT: PERRL, no cervical lymphadenopathy. No masses palpable. Normal oral mucosa  Respiratory:  · Normal excursion and expansion without use of accessory muscles  · Resp Auscultation: Good respiratory effort. No for increased work of breathing.  On auscultation: Clear  Cardiovascular:  · The apical impulse is not displaced  · Heart tones are crisp and normal. regular S1 and S2. Murmurs: none  · Jugular venous pulsation Normal  · The carotid upstroke is normal in amplitude and contour without delay or bruit  · Peripheral pulses are symmetrical and full   Abdomen:  · No masses or tenderness  · Bowel sounds present  Extremities:  ·  No Cyanosis or Clubbing  ·  Lower extremity edema: none  ·  Skin: Warm and dry  Neurological:  · Alert and oriented. · Moves all extremities well  · No abnormalities of mood, affect, memory, mentation, or behavior are noted    DATA:    Diagnostics:      EKG:   Results for orders placed or performed during the hospital encounter of 03/06/20   EKG 12 Lead   Result Value Ref Range    Ventricular Rate 59 BPM    Atrial Rate 59 BPM    P-R Interval 174 ms    QRS Duration 120 ms    Q-T Interval 462 ms    QTc Calculation (Bazett) 457 ms    P Axis 63 degrees    R Axis 15 degrees    T Axis 36 degrees    Narrative    Sinus bradycardia  Non-specific intra-ventricular conduction delay  Borderline ECG  When compared with ECG of 06-MAR-2020 22:45,  No significant change was found     Labs:     CBC:   Recent Labs     03/06/20  1510 03/07/20  0524   WBC 13.5* 12.3*   HGB 16.6 15.4   HCT 49.5 47.1    231     BMP:   Recent Labs     03/06/20  1510 03/07/20  0524    140   K 3.4* 3.7   CO2 26 22   BUN 15 18   CREATININE 1.04 0.86   LABGLOM >60 >60   GLUCOSE 173* 124*     BNP: No results for input(s): BNP in the last 72 hours.   PT/INR:   Recent Labs     03/07/20  0524   PROTIME 11.1   INR 1.1     APTT:  Recent Labs     03/07/20  0025 03/07/20  0524   APTT 34.4* 41.9*     CARDIAC ENZYMES:  Recent Labs     03/06/20  2223 03/07/20  0025 03/07/20  0524   TROPHS 90* 121* 137*     FASTING LIPID PANEL:No results found for: HDL, LDLDIRECT, LDLCALC, TRIG  LIVER PROFILE:  Recent Labs     03/06/20  1510 03/07/20  0524   AST 80* 38   ALT 66* 53*   LABALBU 4.5 3.8         IMPRESSION:    Patient Active Problem List   Diagnosis    [facility]    Dependence on nicotine from cigarettes    Hypertension    Morbid obesity ([facility]    NSTEMI (non-ST elevated myocardial infarction) ([facility]     -Cardiac arrest concerning for VT/VF given the fact that the AED recommended shocking of the rhythm, after shock there was return of circulation  -NSTEMI

## 2020-03-07 NOTE — H&P
Critical Care - History and Physical Examination    Patient's name:  Foster Morales  Medical Record Number: [id number]  Patient's account/billing number: [de-identified]  Patient's YOB: 1960  Age: 64 y.o. Date of Admission: 3/6/2020  8:18 PM  Reason of ICU admission:   Date of History and Physical Examination: 3/6/2020      Primary Care Physician: Manuelito Trevino MD  Attending Physician:    Code Status: Full Code    Chief complaint: chest pain     Reason for ICU admission:  Coded and ROSC achieved. Further evaluation      History Of Present Illness:   History was obtained from chart review and EMS squad. Foster Morales is a 64 y.o. gentleman who was brought in by the emergency squad. Patient was reportedly at work when he collapsed and was in PEA and was given chest compressions and cardioversion/defibrillation for a couple of minutes. ROSC was achieved. Patient called up the emergency squad and he was brought to [facility]. On route patient was hypertensive and received 20 of IV labetalol. Patient also complained of chest pain in the middle of the chest likely from chest compressions and was given 20 of fentanyl to which he responded and his chest pain abated. His D-dimers were elevated  at the outlying facility and CT PE was performed which was nonrevealing for any pulmonary embolism. En route he was started on IV heparin but the strokes were negative. Upon arrival he was on heparin. Upon my assessment, patient was breathing comfortably on nasal cannula 2 L, preliminary troponin  obtained was at 90. We will continue with the heparin consult cardiology. Patient has significant past medical history of hypertension and obstructive sleep apnea. Patient has been a smoker for the last 40 years, 1 PPD, no history of reported COPD. Patient did not report any BPH however his home medications list Terazoin  seen 5 mg twice daily.       Past Medical History:        Diagnosis Date    Alcoholism in recovery [facility])     Report last drink was in [address] Hypertension     REGIS (obstructive sleep apnea)        Past Surgical History:  History reviewed. No pertinent surgical history. Allergies:    No Known Allergies      Home Medications:   Prior to Admission medications    Medication Sig Start Date End Date Taking? Authorizing Provider   terazosin (HYTRIN) 5 MG capsule Take 5 mg by mouth 2 times daily     Historical Provider, MD   lisinopril (PRINIVIL;ZESTRIL) 10 MG tablet Take by mouth 2 times daily     Historical Provider, MD       Social History:   TOBACCO:   reports that he has been smoking cigarettes. He has a 40.00 pack-year smoking history. He has never used smokeless tobacco.  ETOH:   reports previous alcohol use. DRUGS:  reports no history of drug use. OCCUPATION:      Family History:   No family history on file. REVIEW OF SYSTEMS (ROS):  Review of Systems - Negative except mentioned in HPI   General ROS: negative  Psychological ROS: negative  Ophthalmic ROS: negative  ENT: negative  Hematological and Lymphatic ROS: negative  Endocrine ROS: negative  Breast ROS: negative  Respiratory ROS: no cough, shortness of breath, or wheezing  Cardiovascular ROS: no chest pain or dyspnea on exertion  Gastrointestinal ROS:negative  Genito-Urinary ROS: negative  Musculoskeletal ROS: negative  Neurological ROS: negative  Dermatological ROS: negative      Physical Exam:    Vitals: /72   Pulse 63   Temp 97.3 °F (36.3 °C) (Axillary)   Resp 16   Ht 5' 11\" (1.803 m)   Wt (!) 316 lb 2.2 oz (143.4 kg)   SpO2 98%   BMI 44.09 kg/m²     Body weight:   Wt Readings from Last 3 Encounters:   03/06/20 (!) 316 lb 2.2 oz (143.4 kg)   03/06/20 (!) 310 lb (140.6 kg)   04/15/19 (!) 338 lb (153.3 kg)       Body Mass Index : Body mass index is 44.09 kg/m².           PHYSICAL EXAMINATION :  Constitutional: Appears well, in no distress  EENT: PERRLA, EOMI, sclera clear, anicteric, oropharynx clear,

## 2020-03-07 NOTE — CARE COORDINATION
Case Management Initial Discharge Plan  Kiara Noel,             Met with:patient to discuss discharge plans. Information verified: address, contacts, phone number, , insurance Yes  PCP: Gui Leija MD  Date of last visit: 2019    Insurance Provider: Bonnie Barger    Discharge Planning    Living Arrangements:      Support Systems:       Home has 1 stories  1 flight stairs to climb to get into front door,  Location of bedroom/bathroom in home main    Patient able to perform ADL's:Independent    Current Services (outpatient & in home) none  DME equipment: Cpap  DME provider:       Potential Assistance Needed:       Patient agreeable to home care: No  [place] of choice provided:  no    Prior SNF/Rehab Placement and Facility: no  Agreeable to SNF/Rehab: No  [place] of choice provided: no   Evaluation: no    Expected Discharge date:     Patient expects to be discharged to: Follow Up Appointment: Best Day/ Time:      Transportation provider: self  Transportation arrangements needed for discharge: No    Readmission Risk              Risk of Unplanned Readmission:        10             Does patient have a readmission risk score greater than 14?: No  If yes, follow-up appointment must be made within 7 days of discharge.      Goals of Care: have heart checked out      Discharge Plan: home indepedent          Electronically signed by Nicolle Cristina RN on 3/7/20 at 11:52 AM EST

## 2020-03-07 NOTE — PROGRESS NOTES
Independent  Homemaking Responsibilities: Yes  Ambulation Assistance: Independent  Transfer Assistance: Independent  Active : Yes  Occupation: Full time employment  Type of occupation: fork   Leisure & Hobbies: motorcycles, camping     Objective   Vision: Impaired  Vision Exceptions: Wears glasses at all times  Hearing: Within functional limits    Orientation  Overall Orientation Status: Within Functional Limits  Observation/Palpation  Posture: Good  Balance  Sitting Balance: Supervision  Standing Balance: Stand by assistance  Standing Balance  Time: ~1 minute  Activity: functional mobility   Functional Mobility  Functional - Mobility Device: No device  Activity: Other(from chair to bed)  Assist Level: Stand by assistance  ADL  Feeding: Independent  Grooming: Independent  UE Bathing: Stand by assistance  LE Bathing: Stand by assistance  UE Dressing: Stand by assistance  LE Dressing: Stand by assistance(doffed/donned sock)  Toileting: Stand by assistance  Tone RUE  RUE Tone: Normotonic  Tone LUE  LUE Tone: Normotonic  Coordination  Movements Are Fluid And Coordinated: Yes     Bed mobility  Sit to Supine: Modified independent(HOB elevated)  Transfers  Sit to stand: Stand by assistance  Stand to sit: Stand by assistance     Cognition  Overall Cognitive Status: WFL        Sensation  Overall Sensation Status: WFL        LUE AROM (degrees)  LUE AROM : WFL  Left Hand AROM (degrees)  Left Hand AROM: WFL  RUE AROM (degrees)  RUE AROM : WFL  Right Hand AROM (degrees)  Right Hand AROM: WFL  LUE Strength  Gross LUE Strength: WFL  RUE Strength  Gross RUE Strength: WFL      Plan   Plan  Times per week: 4-5x    AM-PAC Score   AM-PAC Inpatient Daily Activity Raw Score: 20 (03/07/20 1400)  AM-PAC Inpatient ADL T-Scale Score : 42.03 (03/07/20 1400)  ADL Inpatient CMS 0-100% Score: 38.32 (03/07/20 1400)  ADL Inpatient CMS G-Code Modifier : CJ (03/07/20 1400)    Goals  Short term goals  Time Frame for Short term goals: By discharge pt will. Romario Large term goal 1: demo 20+ minutes activity tolerance with use of EC/WS prn   Short term goal 2: demo 10+ minutes standing tolerance to engage in functional tasks with supervision  Short term goal 3: demo full ADL task with set up and supervision  Short term goal 4: demo independent bed mobility   Short term goal 5: demo independent transfers        Therapy Time   Individual Concurrent Group Co-treatment   Time In 1050         Time Out 1105         Minutes 15         Timed Code Treatment Minutes: [address], OTR/L

## 2020-03-08 PROBLEM — I46.9 CARDIAC ARREST (HCC): Status: RESOLVED | Noted: 2020-03-06 | Resolved: 2020-03-08

## 2020-03-08 PROBLEM — F17.210 DEPENDENCE ON NICOTINE FROM CIGARETTES: Chronic | Status: ACTIVE | Noted: 2020-03-07

## 2020-03-08 PROBLEM — I10 HYPERTENSION: Chronic | Status: ACTIVE | Noted: 2020-03-07

## 2020-03-08 PROBLEM — I50.21 ACUTE SYSTOLIC CONGESTIVE HEART FAILURE (HCC): Status: ACTIVE | Noted: 2020-03-08

## 2020-03-08 PROBLEM — E66.01 MORBID OBESITY (HCC): Chronic | Status: ACTIVE | Noted: 2020-03-07

## 2020-03-08 PROBLEM — G47.33 OBSTRUCTIVE SLEEP APNEA: Chronic | Status: ACTIVE | Noted: 2020-03-08

## 2020-03-08 LAB
ABSOLUTE EOS #: 0.26 K/UL (ref 0–0.44)
ABSOLUTE IMMATURE GRANULOCYTE: 0.08 K/UL (ref 0–0.3)
ABSOLUTE LYMPH #: 1.67 K/UL (ref 1.1–3.7)
ABSOLUTE MONO #: 0.97 K/UL (ref 0.1–1.2)
ALBUMIN SERPL-MCNC: 3.4 G/DL (ref 3.5–5.2)
ALBUMIN/GLOBULIN RATIO: 1.2 (ref 1–2.5)
ALP BLD-CCNC: 58 U/L (ref 40–129)
ALT SERPL-CCNC: 40 U/L (ref 5–41)
ANION GAP SERPL CALCULATED.3IONS-SCNC: 11 MMOL/L (ref 9–17)
ANION GAP SERPL CALCULATED.3IONS-SCNC: 14 MMOL/L (ref 9–17)
AST SERPL-CCNC: 22 U/L
BASOPHILS # BLD: 1 % (ref 0–2)
BASOPHILS ABSOLUTE: 0.09 K/UL (ref 0–0.2)
BILIRUB SERPL-MCNC: 0.39 MG/DL (ref 0.3–1.2)
BUN BLDV-MCNC: 12 MG/DL (ref 6–20)
BUN BLDV-MCNC: 16 MG/DL (ref 6–20)
BUN/CREAT BLD: ABNORMAL (ref 9–20)
BUN/CREAT BLD: ABNORMAL (ref 9–20)
CALCIUM IONIZED: 1.16 MMOL/L (ref 1.13–1.33)
CALCIUM SERPL-MCNC: 8.8 MG/DL (ref 8.6–10.4)
CALCIUM SERPL-MCNC: 9.3 MG/DL (ref 8.6–10.4)
CHLORIDE BLD-SCNC: 102 MMOL/L (ref 98–107)
CHLORIDE BLD-SCNC: 104 MMOL/L (ref 98–107)
CO2: 22 MMOL/L (ref 20–31)
CO2: 23 MMOL/L (ref 20–31)
CREAT SERPL-MCNC: 0.71 MG/DL (ref 0.7–1.2)
CREAT SERPL-MCNC: 0.79 MG/DL (ref 0.7–1.2)
DIFFERENTIAL TYPE: ABNORMAL
EOSINOPHILS RELATIVE PERCENT: 2 % (ref 1–4)
GFR AFRICAN AMERICAN: >60 ML/MIN
GFR AFRICAN AMERICAN: >60 ML/MIN
GFR NON-AFRICAN AMERICAN: >60 ML/MIN
GFR NON-AFRICAN AMERICAN: >60 ML/MIN
GFR SERPL CREATININE-BSD FRML MDRD: ABNORMAL ML/MIN/{1.73_M2}
GLUCOSE BLD-MCNC: 123 MG/DL (ref 70–99)
GLUCOSE BLD-MCNC: 178 MG/DL (ref 70–99)
HCT VFR BLD CALC: 44.8 % (ref 40.7–50.3)
HEMOGLOBIN: 14.5 G/DL (ref 13–17)
IMMATURE GRANULOCYTES: 1 %
INR BLD: 1
LYMPHOCYTES # BLD: 14 % (ref 24–43)
MAGNESIUM: 2.1 MG/DL (ref 1.6–2.6)
MCH RBC QN AUTO: 30.3 PG (ref 25.2–33.5)
MCHC RBC AUTO-ENTMCNC: 32.4 G/DL (ref 28.4–34.8)
MCV RBC AUTO: 93.7 FL (ref 82.6–102.9)
MONOCYTES # BLD: 8 % (ref 3–12)
NRBC AUTOMATED: 0 PER 100 WBC
PARTIAL THROMBOPLASTIN TIME: 57.9 SEC (ref 20.5–30.5)
PARTIAL THROMBOPLASTIN TIME: 70.9 SEC (ref 20.5–30.5)
PDW BLD-RTO: 14.1 % (ref 11.8–14.4)
PHOSPHORUS: 3.5 MG/DL (ref 2.5–4.5)
PLATELET # BLD: 201 K/UL (ref 138–453)
PLATELET ESTIMATE: ABNORMAL
PMV BLD AUTO: 11 FL (ref 8.1–13.5)
POTASSIUM SERPL-SCNC: 4 MMOL/L (ref 3.7–5.3)
POTASSIUM SERPL-SCNC: 4.2 MMOL/L (ref 3.7–5.3)
PROTHROMBIN TIME: 10.3 SEC (ref 9–12)
RBC # BLD: 4.78 M/UL (ref 4.21–5.77)
RBC # BLD: ABNORMAL 10*6/UL
SEG NEUTROPHILS: 74 % (ref 36–65)
SEGMENTED NEUTROPHILS ABSOLUTE COUNT: 9.02 K/UL (ref 1.5–8.1)
SODIUM BLD-SCNC: 138 MMOL/L (ref 135–144)
SODIUM BLD-SCNC: 138 MMOL/L (ref 135–144)
TOTAL PROTEIN: 6.2 G/DL (ref 6.4–8.3)
TROPONIN INTERP: ABNORMAL
TROPONIN T: ABNORMAL NG/ML
TROPONIN, HIGH SENSITIVITY: 149 NG/L (ref 0–22)
TROPONIN, HIGH SENSITIVITY: 152 NG/L (ref 0–22)
TROPONIN, HIGH SENSITIVITY: 84 NG/L (ref 0–22)
TROPONIN, HIGH SENSITIVITY: 95 NG/L (ref 0–22)
WBC # BLD: 12.1 K/UL (ref 3.5–11.3)
WBC # BLD: ABNORMAL 10*3/UL

## 2020-03-08 PROCEDURE — 6370000000 HC RX 637 (ALT 250 FOR IP): Performed by: STUDENT IN AN ORGANIZED HEALTH CARE EDUCATION/TRAINING PROGRAM

## 2020-03-08 PROCEDURE — 2580000003 HC RX 258: Performed by: STUDENT IN AN ORGANIZED HEALTH CARE EDUCATION/TRAINING PROGRAM

## 2020-03-08 PROCEDURE — 6360000002 HC RX W HCPCS: Performed by: STUDENT IN AN ORGANIZED HEALTH CARE EDUCATION/TRAINING PROGRAM

## 2020-03-08 PROCEDURE — 6370000000 HC RX 637 (ALT 250 FOR IP): Performed by: INTERNAL MEDICINE

## 2020-03-08 PROCEDURE — 36415 COLL VENOUS BLD VENIPUNCTURE: CPT

## 2020-03-08 PROCEDURE — 94640 AIRWAY INHALATION TREATMENT: CPT

## 2020-03-08 PROCEDURE — 84100 ASSAY OF PHOSPHORUS: CPT

## 2020-03-08 PROCEDURE — 99291 CRITICAL CARE FIRST HOUR: CPT | Performed by: INTERNAL MEDICINE

## 2020-03-08 PROCEDURE — 83735 ASSAY OF MAGNESIUM: CPT

## 2020-03-08 PROCEDURE — 80048 BASIC METABOLIC PNL TOTAL CA: CPT

## 2020-03-08 PROCEDURE — 85025 COMPLETE CBC W/AUTO DIFF WBC: CPT

## 2020-03-08 PROCEDURE — 85730 THROMBOPLASTIN TIME PARTIAL: CPT

## 2020-03-08 PROCEDURE — 85610 PROTHROMBIN TIME: CPT

## 2020-03-08 PROCEDURE — 94761 N-INVAS EAR/PLS OXIMETRY MLT: CPT

## 2020-03-08 PROCEDURE — 99223 1ST HOSP IP/OBS HIGH 75: CPT | Performed by: INTERNAL MEDICINE

## 2020-03-08 PROCEDURE — 80053 COMPREHEN METABOLIC PANEL: CPT

## 2020-03-08 PROCEDURE — 82330 ASSAY OF CALCIUM: CPT

## 2020-03-08 PROCEDURE — 84484 ASSAY OF TROPONIN QUANT: CPT

## 2020-03-08 PROCEDURE — 2060000000 HC ICU INTERMEDIATE R&B

## 2020-03-08 RX ORDER — PREDNISONE 20 MG/1
40 TABLET ORAL DAILY
Status: DISCONTINUED | OUTPATIENT
Start: 2020-03-08 | End: 2020-03-11 | Stop reason: HOSPADM

## 2020-03-08 RX ORDER — DOCUSATE SODIUM 100 MG/1
100 CAPSULE, LIQUID FILLED ORAL 2 TIMES DAILY
Status: DISCONTINUED | OUTPATIENT
Start: 2020-03-08 | End: 2020-03-11 | Stop reason: HOSPADM

## 2020-03-08 RX ORDER — GUAIFENESIN DEXTROMETHORPHAN HYDROBROMIDE ORAL SOLUTION 10; 100 MG/5ML; MG/5ML
10 SOLUTION ORAL EVERY 6 HOURS PRN
Status: DISCONTINUED | OUTPATIENT
Start: 2020-03-08 | End: 2020-03-11 | Stop reason: HOSPADM

## 2020-03-08 RX ORDER — CARVEDILOL 6.25 MG/1
6.25 TABLET ORAL 2 TIMES DAILY WITH MEALS
Status: DISCONTINUED | OUTPATIENT
Start: 2020-03-08 | End: 2020-03-09

## 2020-03-08 RX ADMIN — DESMOPRESSIN ACETATE 40 MG: 0.2 TABLET ORAL at 21:00

## 2020-03-08 RX ADMIN — DOCUSATE SODIUM 100 MG: 100 CAPSULE, LIQUID FILLED ORAL at 21:00

## 2020-03-08 RX ADMIN — SODIUM CHLORIDE, PRESERVATIVE FREE 10 ML: 5 INJECTION INTRAVENOUS at 09:25

## 2020-03-08 RX ADMIN — IPRATROPIUM BROMIDE AND ALBUTEROL SULFATE 1 AMPULE: .5; 3 SOLUTION RESPIRATORY (INHALATION) at 20:42

## 2020-03-08 RX ADMIN — FENTANYL CITRATE 50 MCG: 50 INJECTION, SOLUTION INTRAMUSCULAR; INTRAVENOUS at 05:16

## 2020-03-08 RX ADMIN — FENTANYL CITRATE 50 MCG: 50 INJECTION, SOLUTION INTRAMUSCULAR; INTRAVENOUS at 17:08

## 2020-03-08 RX ADMIN — PREDNISONE 40 MG: 20 TABLET ORAL at 09:18

## 2020-03-08 RX ADMIN — FAMOTIDINE 20 MG: 20 TABLET, FILM COATED ORAL at 09:13

## 2020-03-08 RX ADMIN — DOCUSATE SODIUM 100 MG: 100 CAPSULE, LIQUID FILLED ORAL at 09:21

## 2020-03-08 RX ADMIN — SODIUM CHLORIDE, PRESERVATIVE FREE 10 ML: 5 INJECTION INTRAVENOUS at 21:01

## 2020-03-08 RX ADMIN — ASPIRIN 81 MG: 81 TABLET, CHEWABLE ORAL at 09:13

## 2020-03-08 RX ADMIN — CARVEDILOL 6.25 MG: 6.25 TABLET, FILM COATED ORAL at 17:08

## 2020-03-08 RX ADMIN — HEPARIN SODIUM 18 UNITS/KG/HR: 10000 INJECTION, SOLUTION INTRAVENOUS at 05:39

## 2020-03-08 RX ADMIN — CARVEDILOL 6.25 MG: 6.25 TABLET, FILM COATED ORAL at 09:21

## 2020-03-08 RX ADMIN — IPRATROPIUM BROMIDE AND ALBUTEROL SULFATE 1 AMPULE: .5; 3 SOLUTION RESPIRATORY (INHALATION) at 08:12

## 2020-03-08 RX ADMIN — HEPARIN SODIUM 17.99 UNITS/KG/HR: 10000 INJECTION, SOLUTION INTRAVENOUS at 17:07

## 2020-03-08 RX ADMIN — Medication 10 ML: at 21:39

## 2020-03-08 ASSESSMENT — PAIN SCALES - GENERAL
PAINLEVEL_OUTOF10: 0
PAINLEVEL_OUTOF10: 0
PAINLEVEL_OUTOF10: 5
PAINLEVEL_OUTOF10: 7
PAINLEVEL_OUTOF10: 1

## 2020-03-08 ASSESSMENT — PAIN DESCRIPTION - ONSET: ONSET: ON-GOING

## 2020-03-08 ASSESSMENT — PAIN DESCRIPTION - LOCATION: LOCATION: CHEST

## 2020-03-08 ASSESSMENT — PAIN DESCRIPTION - PAIN TYPE: TYPE: ACUTE PAIN

## 2020-03-08 ASSESSMENT — PULMONARY FUNCTION TESTS: PIF_VALUE: 20

## 2020-03-08 ASSESSMENT — PAIN DESCRIPTION - DESCRIPTORS: DESCRIPTORS: ACHING

## 2020-03-08 NOTE — PLAN OF CARE
Pain:  Goal: Pain level will decrease  Description: Pain level will decrease  Outcome: Ongoing     Problem: Musculor/Skeletal Functional Status  Goal: Highest potential functional level  Outcome: Ongoing  Goal: Absence of falls  Outcome: Ongoing

## 2020-03-08 NOTE — PROGRESS NOTES
palpitations, SOB, dizziness at rest and on exertion. Tolerating oral feeds, good urine output but not measuring accurately. Net positive balance of 3 fluid. On heparin drip. Started on Coreg 6.25 mg this morning. Significant smoking history and wishing to quit without patch/gums. No PFTs in the past and no diagnosed COPD. No heart conditions diagnosed in the past.  Father with history of MI at age 79. No other significant family history. Works at [employer]. Physical Exam:   Vitals: BP (!) 165/95   Pulse 78   Temp 98.6 °F (37 °C) (Oral)   Resp 20   Ht 5' 11\" (1.803 m)   Wt (!) 322 lb 1.6 oz (146.1 kg)   SpO2 94%   BMI 44.92 kg/m²   24 hour intake/output:    Intake/Output Summary (Last 24 hours) at 3/8/2020 1426  Last data filed at 3/8/2020 1400  Gross per 24 hour   Intake 2640.2 ml   Output 450 ml   Net 2190.2 ml     Last 3 weights: Wt Readings from Last 3 Encounters:   03/08/20 (!) 322 lb 1.6 oz (146.1 kg)   03/06/20 (!) 310 lb (140.6 kg)   04/15/19 (!) 338 lb (153.3 kg)       General appearance - alert, in no distress. Morbid obesity with BMI 45. Mental status - alert, oriented to person, place, and time  Eyes - pupils equal and reactive, extraocular eye movements intact  Mouth - mucous membranes moist, pharynx normal without lesions  Neck - supple, no significant adenopathy  Respiratory- bilateral diminished breath sounds, prolonged expiratory phase with few rhonchi and bibasilar occasional wheezing. No inspiratory rales. Heart - normal rate, regular rhythm, normal S1, S2  Abdomen - soft, nontender, distended but with bowel sounds. Neurological - alert, oriented, normal speech, no focal deficits   Extremities - peripheral pulses normal, trace pitting bilateral pedal edema.       Medications:Current Inpatient  Scheduled Meds:   predniSONE  40 mg Oral Daily    carvedilol  6.25 mg Oral BID WC    docusate sodium  100 mg Oral BID    aspirin  81 mg Oral Daily    atorvastatin  40 mg Oral Nightly    sodium chloride flush  10 mL Intravenous 2 times per day    nicotine  1 patch Transdermal Daily    ipratropium-albuterol  1 ampule Inhalation 4x daily    heparin (porcine)  4,000 Units Intravenous Once     Continuous Infusions:   heparin (porcine) 18 Units/kg/hr (03/08/20 0539)     PRN Meds:sodium chloride flush, acetaminophen **OR** acetaminophen, polyethylene glycol, promethazine **OR** ondansetron, potassium chloride, magnesium sulfate, albuterol, fentanNYL, heparin (porcine), heparin (porcine)    Objective:    CBC:   Recent Labs     03/06/20  1510 03/07/20  0524 03/08/20 0448   WBC 13.5* 12.3* 12.1*   HGB 16.6 15.4 14.5    231 201     BMP:    Recent Labs     03/06/20  1510 03/07/20 0524 03/08/20 0448    140 138   K 3.4* 3.7 4.0   CL 99 102 102   CO2 26 22 22   BUN 15 18 16   CREATININE 1.04 0.86 0.79   GLUCOSE 173* 124* 123*     Calcium:  Recent Labs     03/08/20 0448   CALCIUM 8.8     Ionized Calcium:No results for input(s): IONCA in the last 72 hours. Magnesium:  Recent Labs     03/08/20 0448   MG 2.1     Phosphorus:  Recent Labs     03/08/20 0448   PHOS 3.5     BNP:No results for input(s): BNP in the last 72 hours. Glucose:No results for input(s): POCGLU in the last 72 hours. HgbA1C: No results for input(s): LABA1C in the last 72 hours. INR:   Recent Labs     03/08/20 0448   INR 1.0     Hepatic:   Recent Labs     03/08/20 0448   ALKPHOS 58   ALT 40   AST 22   PROT 6.2*   BILITOT 0.39   LABALBU 3.4*     Amylase and Lipase:  Recent Labs     03/06/20 2223   LACTA NOT REPORTED     Lactic Acid:   Recent Labs     03/06/20 2223   LACTA NOT REPORTED     CARDIAC ENZYMES:No results for input(s): CKTOTAL, CKMB, CKMBINDEX, TROPONINI in the last 72 hours. BNP: No results for input(s): BNP in the last 72 hours. Lipids: No results for input(s): CHOL, TRIG, HDL, LDLCALC in the last 72 hours.     Invalid input(s): LDL  ABGs: No results found for: PH, PCO2, PO2, HCO3, O2SAT  Thyroid: No results found for: TSH     Urinalysis: No results for input(s): BACTERIA, BLOODU, CLARITYU, COLORU, PHUR, PROTEINU, RBCUA, SPECGRAV, BILIRUBINUR, NITRU, WBCUA, LEUKOCYTESUR, GLUCOSEU in the last 72 hours. Assessment:  Principal Problem (Resolved):    Cardiac arrest [facility])  Active Problems:    Dependence on nicotine from cigarettes    Hypertension    Morbid obesity (HCC)    NSTEMI (non-ST elevated myocardial infarction) ([address]    Obstructive sleep apnea          Plan:  1. S/p cardiac arrest with shockable rhythm: Resolved with CPR, 1 round of defibrillation. 2.  Suspected NSTEMI: Continue heparin drip. No significant EKG changes. Troponin trending down. Cardiology planning cardiac cath Monday a.m.    3.  Systolic heart failure with LV dysfunction: No baseline cardiac function found. Rule out ischemic versus nonischemic cardiomyopathy. Strict intake output, daily weights. 4.  Suspect COPD exacerbation: No PFTs available. Prednisone 40 mg for total 5 days. RSV panel negative. Recommended outpatient pulmonology follow-up. 5.  Tobacco abuse 40 PY: Extensive discussion with counseling smoking cessation. Patient wishing to quit without the use of nicotine patch. 5.  REGIS: BiPAP at night and day naps. 6.  Essential hypertension: Started Coreg 6.25 mg twice daily today. Accurate BP measurement and monitor for now. Held home medication lisinopril 10 mg twice daily. DVT PX: Already on heparin. GI PX: Not indicated  PT/OT: Consulted  Discharge planning: . Return to home after cardiac cath. Roma Gonsalves MD             Department of Internal Medicine  [facility]           3/8/2020, 2:26 PM  Attending Physician Statement  I have discussed the care of Koki Kinney and I have examined the patient myselft and taken ros and hpi , including pertinent history and exam findings,  with the resident.  I have reviewed the key elements of all parts of the encounter with the resident. I agree with the assessment, plan and orders as documented by the resident.       Electronically signed by Araceli Thakkar MD

## 2020-03-08 NOTE — PROGRESS NOTES
Anuric      OBJECTIVE:     VITAL SIGNS:  BP (!) 159/94 Comment: up to BR/returned to bed   Pulse 81   Temp 98.6 °F (37 °C) (Oral)   Resp 19   Ht 5' 11\" (1.803 m)   Wt (!) 322 lb 1.6 oz (146.1 kg)   SpO2 98%   BMI 44.92 kg/m²   Tmax over 24 hours:  Temp (24hrs), Av.4 °F (36.9 °C), Min:98.2 °F (36.8 °C), Max:98.6 °F (37 °C)      Patient Vitals for the past 8 hrs:   BP Temp Temp src Pulse Resp SpO2 Weight   20 1200 (!) 159/94 98.6 °F (37 °C) Oral 81 19 98 % --   20 1100 (!) 143/78 -- -- 74 18 99 % --   20 1056 -- -- -- -- -- -- (!) 322 lb 1.6 oz (146.1 kg)   20 1000 (!) 161/125 -- -- 88 19 98 % --   20 0900 -- 98.2 °F (36.8 °C) Oral 70 17 95 % --   20 0812 -- -- -- -- 16 96 % --   20 0800 (!) 155/103 -- -- 72 16 97 % --   20 0700 137/86 -- -- 60 15 94 % --   20 0600 112/70 -- -- 61 16 92 % --         Intake/Output Summary (Last 24 hours) at 3/8/2020 1302  Last data filed at 3/8/2020 1223  Gross per 24 hour   Intake 2580.2 ml   Output --   Net 2580.2 ml     Date 20 0000 - 20 2359   Shift 6260-0007 7117-2261 8586-3612 24 Hour Total   INTAKE   P.O.(mL/kg/hr)  220  220   I. V.(mL/kg) 531(3.7) 565.7(3.9)  1096.7(7.5)   Shift Total(mL/kg) 531(3.7) 785. 7(5.4)  1316.7(9)   OUTPUT   Shift Total(mL/kg)       Weight (kg) 144.1 146. 1 146. 1 146. 1     Wt Readings from Last 3 Encounters:   20 (!) 322 lb 1.6 oz (146.1 kg)   20 (!) 310 lb (140.6 kg)   04/15/19 (!) 338 lb (153.3 kg)     Body mass index is 44.92 kg/m². PHYSICAL EXAM:  Constitutional: Appears well, alert, oriented x3  EENT: PERRLA, EOMI, sclera clear, anicteric, oropharynx clear, no lesions, neck supple with midline trachea.   Neck: Supple, symmetrical, trachea midline, no adenopathy, thyroid symmetric, no jvd skin normal  Respiratory: Bilateral wheezing  Cardiovascular: regular rate and rhythm, normal S1, S2, no murmur noted and 2+ pulses throughout  Abdomen: soft, nontender, 03/06/20 1510 03/07/20 0524 03/08/20 0448   WBC 13.5* 12.3* 12.1*   HGB 16.6 15.4 14.5   MCV 89.0 93.1 93.7    231 201   RBC 5.57 5.06 4.78   HCT 49.5 47.1 44.8   MCH 29.8 30.4 30.3   MCHC 33.5 32.7 32.4   RDW 14.2 14.0 14.1   MPV NOT REPORTED 11.1 11.0        PT/INR:    Lab Results   Component Value Date    PROTIME 10.3 03/08/2020    INR 1.0 03/08/2020     PTT:    Lab Results   Component Value Date    APTT 70.9 03/08/2020       Basal Metabolic Profile:   Recent Labs     03/06/20 1510 03/07/20 0524 03/08/20 0448    140 138   K 3.4* 3.7 4.0   BUN 15 18 16   CREATININE 1.04 0.86 0.79   CL 99 102 102   CO2 26 22 22      Magnesium:   Lab Results   Component Value Date    MG 2.1 03/08/2020    MG 2.3 03/07/2020    MG 2.3 03/06/2020     Phosphorus:   Lab Results   Component Value Date    PHOS 3.5 03/08/2020    PHOS 3.8 03/07/2020    PHOS 3.7 03/06/2020     S. Calcium:  Recent Labs     03/08/20 0448   CALCIUM 8.8     S. Ionized Calcium:No results for input(s): IONCA in the last 72 hours. Urinalysis: No results found for: NITRU, COLORU, PHUR, LABCAST, WBCUA, RBCUA, MUCUS, TRICHOMONAS, YEAST, BACTERIA, CLARITYU, SPECGRAV, LEUKOCYTESUR, UROBILINOGEN, BILIRUBINUR, BLOODU, GLUCOSEU, KETUA, AMORPHOUS    CARDIAC ENZYMES: No results for input(s): CKMB, CKMBINDEX, TROPONINI in the last 72 hours. Invalid input(s): CKTOTAL;3  BNP: No results for input(s): BNP in the last 72 hours. LFTS  Recent Labs     03/06/20 1510 03/07/20 0524 03/08/20 0448   ALKPHOS 83 62 58   ALT 66* 53* 40   AST 80* 38 22   BILITOT 0.35 0.48 0.39   LABALBU 4.5 3.8 3.4*       AMYLASE/LIPASE/AMMONIA  No results for input(s): AMYLASE, LIPASE, AMMONIA in the last 72 hours.     Last 3 Blood Glucose:   Recent Labs     03/06/20  1510 03/07/20  0524 03/08/20  0448   GLUCOSE 173* 124* 123*      HgBA1c:  No results found for: LABA1C      TSH:  No results found for: TSH  ANEMIA STUDIES  No results for input(s): LABIRON, TIBC, FERRITIN, YASSINE Olivera in the last 72 hours. Cultures during this admission:     Blood cultures:                 [] None drawn      [x] Negative             []  Positive (Details:  )  Urine Culture:                   [] None drawn      [x] Negative             []  Positive (Details:  )  Sputum Culture:               [] None drawn       [] Negative             []  Positive (Details:  )   Endotracheal aspirate:     [] None drawn       [] Negative             []  Positive (Details:  )             Chest Xray (3/8/2020):    ASSESSMENT:     Patient Active Problem List   Diagnosis    Card[facility]    Dependence on nicotine from cigarettes    Hypertension    Morbid obesity ([facility]    NSTEMI (non-ST elevated myocardial infarction) ([facility]           PLAN:     Cardiac arrest  ( VT/VF) status post CPR/defibrillation/cardioversion with return of RosC   alert, oriented x3   On room air saturating 98%  On heparin drip for possible NSTEMI  Left heart catheterization on Monday as per cardiology  Echo shows new onset systolic heart failure with EF of 45 to 50%   on Coreg 6.25 mg twice daily for hypertension/reduced EF  On aspirin 81 mg daily  On atorvastatin 40 mg daily  N.p.o. after midnight for possible cath tomorrow  Will add prednisone 40 mg daily for 5 days for  mild COPD. On albuterol as needed and DuoNeb 4 times daily  Keep mag above 2 , potassium above 4    Janice Evans M.D.              Department of Internal Medicine/ Critical care  [address])             3/8/2020, 1:02 PM

## 2020-03-08 NOTE — FLOWSHEET NOTE
To 3018, A&O, with monitor, in bed, with all belongings including phone, and he has dentures in his mouth, he has updated his family, IV heparin continued, arrived w/o difficulty, then walked to the chair and family arrived to visit,

## 2020-03-08 NOTE — PLAN OF CARE
Problem: RESPIRATORY  Intervention: Respiratory assessment  Note: BRONCHOSPASM/BRONCHOCONSTRICTION     [x]         IMPROVE AERATION/BREATH SOUNDS  [x]   ADMINISTER BRONCHODILATOR THERAPY AS APPROPRIATE  [x]   ASSESS BREATH SOUNDS  []   IMPLEMENT AEROSOL/MDI PROTOCOL  [x]   PATIENT EDUCATION AS NEEDED     Intervention: Initiate non-invasive mechanical ventilation  Note: NON INVASIVE VENTILATION  PROVIDE OPTIMAL VENTILATION/ACCEPTABLE SP02  IMPLEMENT NON INVASIVE VENTILATION PROTOCOL  ASSESSMENT SKIN INTEGRITY  PATIENT EDUCATION AS NEEDED  BIPAP AS NEEDED

## 2020-03-08 NOTE — PROGRESS NOTES
[facility]   Progress Note                   Date:   3/8/2020  Patient name: Reggie Thakkar  Date of admission:  3/6/2020  8:18 PM  MRN:   [id number]  YOB: 1960  PCP: Steven Pace MD    Reason for Admission:     Subjective:       Clinical Changes / Abnormalities: no cp, sob, orthopnea. Medications:   Scheduled Meds:   predniSONE  40 mg Oral Daily    carvedilol  6.25 mg Oral BID WC    docusate sodium  100 mg Oral BID    aspirin  81 mg Oral Daily    atorvastatin  40 mg Oral Nightly    sodium chloride flush  10 mL Intravenous 2 times per day    famotidine  20 mg Oral BID    nicotine  1 patch Transdermal Daily    ipratropium-albuterol  1 ampule Inhalation 4x daily    heparin (porcine)  4,000 Units Intravenous Once     Continuous Infusions:   sodium chloride 50 mL/hr at 03/07/20 1744    heparin (porcine) 18 Units/kg/hr (03/08/20 0539)     CBC:   Recent Labs     03/06/20  1510 03/07/20  0524 03/08/20  0448   WBC 13.5* 12.3* 12.1*   HGB 16.6 15.4 14.5    231 201     BMP:    Recent Labs     03/06/20  1510 03/07/20  0524 03/08/20  0448    140 138   K 3.4* 3.7 4.0   CL 99 102 102   CO2 26 22 22   BUN 15 18 16   CREATININE 1.04 0.86 0.79   GLUCOSE 173* 124* 123*     Hepatic:   Recent Labs     03/06/20  1510 03/07/20  0524 03/08/20  0448   AST 80* 38 22   ALT 66* 53* 40   BILITOT 0.35 0.48 0.39   ALKPHOS 83 62 58     Troponin:   Recent Labs     03/08/20  0009 03/08/20  0448 03/08/20  0906   TROPHS 152* 149* 84*     BNP: No results for input(s): BNP in the last 72 hours. Lipids: No results for input(s): CHOL, HDL in the last 72 hours.     Invalid input(s): LDLCALCU  INR:   Recent Labs     03/07/20  0524 03/08/20  0448   INR 1.1 1.0       Objective:   Vitals: BP (!) 159/94 Comment: up to BR/returned to bed   Pulse 81   Temp 98.6 °F (37 °C) (Oral)   Resp 19   Ht 5' 11\" (1.803 m)   Wt (!) 322 lb 1.6 oz (146.1 kg)   SpO2 98%   BMI 44.92 kg/m²   General [id number]   Interpreting Physician      [address] [place] [address]      Fellow                   Referring Nurse                            Practitioner      Interpreting             Referring Physician         Timothy Leavitt   Fellow     Additional Comments  Technically difficult study, patient body habitus. Type of Study      TTE procedure:2D Echocardiogram, M-Mode, Doppler, Color Doppler. Procedure Date  Date: 03/07/2020 Start: 11:12 AM    Study Location: [facility]  Technical Quality: Adequate visualization    Indications:LV Function. History / Tech. Comments:  Procedure explained to patient. Smoker  HTN    Patient Status: Inpatient    Height: 71 inches Weight: 315.01 pounds BSA: 2.56 m^2 BMI: 43.93 kg/m^2    CONCLUSIONS    Summary  Left ventricle is normal in size, borderline increased left ventricular wall  thickness, global left ventricular systolic function is mildly reduced,  calculated ejection fraction is 45-50%. Left atrium is mildly dilated. No significant valvular regurgitation or stenosis seen. Signature  ----------------------------------------------------------------------------   Electronically signed by Diane Astudillo(Sonographer) on 03/07/2020 12:05 PM  ----------------------------------------------------------------------------    ----------------------------------------------------------------------------   Electronically signed by Gage Ricci(Interpreting physician) on 03/07/2020   01:28 PM  ----------------------------------------------------------------------------  FINDINGS  Left Atrium  Left atrium is mildly dilated. Inter-atrial septum is intact with no evidence for an atrial septal defect,  by color doppler. Left Ventricle  Left ventricle is normal in size, borderline increased left ventricular wall  thickness, global left ventricular systolic function is low normal,  calculated ejection fraction is 45-50%. Right Atrium  Right atrium is normal in size.   Right Ventricle  Normal right ventricular size and function. Mitral Valve  Normal mitral valve structure. No evidence of mitral regurgitation. Aortic Valve  Aortic valve is trileaflet. No evidence of aortic insufficiency or stenosis. Tricuspid Valve  Tricuspid valve was not well visualized. No tricuspid regurgitation was seen. Insignificant tricuspid regurgitation, unable to estimate RVSP. Pulmonic Valve  Pulmonic valve not well visualized but Doppler velocities are normal.  Pericardial Effusion  No significant pericardial effusion is seen. Miscellaneous  Normal aortic root dimension. E/E' average = 9.05. IVC normal diameter & inspiratory collapse indicating normal RA filling  pressure .     M-mode / 2D Measurements & Calculations:      LVIDd:5.6 cm(3.7 - 5.6 cm)        Diastolic MBDEXO:759 ml   UMNI:8.9 cm(0.6 - 1.1 cm)         Systolic BHRLLH:41.0 ml   LVPWd:1 cm(0.6 - 1.1 cm)          Aortic Root:3.7 cm(2.0 - 3.7 cm)                                     LA Dimension: 4.1 cm(1.9 - 4.0 cm)   Calculated LVEF (%): 43.65 %      LA volume/Index: 63.3 ml /25m^2                                     LVOT:2.3 cm                                     RVDd:4.2 cm      Mitral:                                 Aortic      Valve Area (P1/2-Time): 3.06 cm^2       Peak Velocity: 1.48 m/s   Peak E-Wave: 0.76 m/s                   Mean Velocity: 0.97 m/s   Peak A-Wave: 1.21 m/s                   Peak Gradient: 8.76 mmHg   E/A Ratio: 0.63                         Mean Gradient: 4 mmHg   Peak Gradient: 2.3 mmHg   Mean Gradient: 2 mmHg   Deceleration Time: 187 msec             Area (continuity): 3.96 cm^2   P1/2t: 72 msec                          AV VTI: 30.9 cm      Area (continuity): 4.75 cm^2   Mean Velocity: 0.59 m/s                                              Pulmonic:                                              Peak Velocity: 0.99 m/s                                           Peak Gradient: 3.93 mmHg     Diastology / Tissue

## 2020-03-08 NOTE — PLAN OF CARE
Problem: Cardiac:  Goal: Ability to maintain vital signs within normal range will improve  Description: Ability to maintain vital signs within normal range will improve  Outcome: Ongoing     Problem: Cardiac:  Goal: Cardiovascular alteration will improve  Description: Cardiovascular alteration will improve  Outcome: Ongoing     Problem: Health Behavior:  Goal: Will modify at least one risk factor affecting health status  Description: Will modify at least one risk factor affecting health status  Outcome: Ongoing     Problem: Physical Regulation:  Goal: Complications related to the disease process, condition or treatment will be avoided or minimized  Description: Complications related to the disease process, condition or treatment will be avoided or minimized  Outcome: Ongoing     Problem: Health Behavior:  Goal: Identification of resources available to assist in meeting health care needs will improve  Description: Identification of resources available to assist in meeting health care needs will improve  Outcome: Ongoing     Problem: Anxiety/Stress:  Goal: Level of anxiety will decrease  Description: Level of anxiety will decrease  Outcome: Ongoing     Problem: Cardiac Output - Decreased:  Goal: Hemodynamic stability will improve  Description: Hemodynamic stability will improve  Outcome: Ongoing     Problem: Fluid Volume - Imbalance:  Goal: Absence of imbalanced fluid volume signs and symptoms  Description: Absence of imbalanced fluid volume signs and symptoms  Outcome: Ongoing     Problem: Breathing Pattern - Ineffective:  Goal: Ability to achieve and maintain a regular respiratory rate will improve  Description: Ability to achieve and maintain a regular respiratory rate will improve  Outcome: Ongoing     Problem: Pain:  Goal: Pain level will decrease  Description: Pain level will decrease  Outcome: Ongoing     Problem: Musculor/Skeletal Functional Status  Goal: Highest potential functional level  Outcome: Ongoing     Problem: Musculor/Skeletal Functional Status  Goal: Absence of falls  Outcome: Ongoing

## 2020-03-08 NOTE — PROGRESS NOTES
ARVIND Ellisonatipilar Assessment complete. Cardiac arrest ([facility] [I46.9] . Vitals:    03/08/20 0335   BP:    Pulse: 63   Resp: 22   Temp:    SpO2: 99%   . Patients home meds are   Prior to Admission medications    Medication Sig Start Date End Date Taking?  Authorizing Provider   terazosin (HYTRIN) 5 MG capsule Take 5 mg by mouth 2 times daily     Historical Provider, MD   lisinopril (PRINIVIL;ZESTRIL) 10 MG tablet Take by mouth 2 times daily     Historical Provider, MD   .  Recent Surgical History: None = 0     Assessment    RR 16  Breath Sounds: Diminished/Expiratory wheezes      · Bronchodilator assessment at level  3  · Hyperinflation assessment at level   · Secretion Management assessment at level    ·   · [x]    Bronchodilator Assessment  BRONCHODILATOR ASSESSMENT SCORE  Score 0 1 2 3 4 5   Breath Sounds   []  Patient Baseline []  No Wheeze good aeration []  Faint, scattered wheezing, good aeration [x]  Expiratory Wheezing and or moderately diminished []  Insp/Exp wheeze and/or very diminished []  Insp/Exp and/ or marked distress   Respiratory Rate   []  Patient Baseline [x]  Less than 20 [x]  Less than 20 []  20-25 []  Greater than 25 []  Greater than 25   Peak flow % of Pred or PB [x]  NA   []  Greater than 90%  []  81-90% []  71-80% []  Less than or equal to 70%  or unable to perform []  Unable due to Respiratory Distress   Dyspnea re []  Patient Baseline [x]  No SOB [x]  No SOB []  SOB on exertion []  SOB min activity []  At rest/acute   e FEV% Predicted       [x]  NA []  Above 69%  []  Unable []  Above 60-69%  []  Unable []  Above 50-59%  []  Unable []  Above 35-49%  []  Unable []  Less than 35%  []  Unable                 []  Hyperinflation Assessment  Score 1 2 3   CXR and Breath Sounds   []  Clear []  No atelectasis  Basilar aeration []  Atelectasis or absent basilar breath sounds   Incentive Spirometry Volume  (Per IBW)   []  Greater than or equal to 15ml/Kg []  less than 15ml/Kg []  less than

## 2020-03-09 ENCOUNTER — APPOINTMENT (OUTPATIENT)
Dept: CARDIAC CATH/INVASIVE PROCEDURES | Age: 60
DRG: 224 | End: 2020-03-09
Attending: INTERNAL MEDICINE
Payer: COMMERCIAL

## 2020-03-09 ENCOUNTER — APPOINTMENT (OUTPATIENT)
Dept: GENERAL RADIOLOGY | Age: 60
DRG: 224 | End: 2020-03-09
Attending: INTERNAL MEDICINE
Payer: COMMERCIAL

## 2020-03-09 LAB
ABSOLUTE EOS #: 0.09 K/UL (ref 0–0.44)
ABSOLUTE IMMATURE GRANULOCYTE: 0.11 K/UL (ref 0–0.3)
ABSOLUTE LYMPH #: 1.78 K/UL (ref 1.1–3.7)
ABSOLUTE MONO #: 1.15 K/UL (ref 0.1–1.2)
BASOPHILS # BLD: 1 % (ref 0–2)
BASOPHILS ABSOLUTE: 0.09 K/UL (ref 0–0.2)
DIFFERENTIAL TYPE: ABNORMAL
EOSINOPHILS RELATIVE PERCENT: 1 % (ref 1–4)
HCT VFR BLD CALC: 47.4 % (ref 40.7–50.3)
HEMOGLOBIN: 14.6 G/DL (ref 13–17)
IMMATURE GRANULOCYTES: 1 %
LYMPHOCYTES # BLD: 12 % (ref 24–43)
MCH RBC QN AUTO: 29.9 PG (ref 25.2–33.5)
MCHC RBC AUTO-ENTMCNC: 30.8 G/DL (ref 28.4–34.8)
MCV RBC AUTO: 97.1 FL (ref 82.6–102.9)
MONOCYTES # BLD: 8 % (ref 3–12)
NRBC AUTOMATED: 0 PER 100 WBC
PARTIAL THROMBOPLASTIN TIME: 59.9 SEC (ref 20.5–30.5)
PDW BLD-RTO: 13.6 % (ref 11.8–14.4)
PLATELET # BLD: 215 K/UL (ref 138–453)
PLATELET ESTIMATE: ABNORMAL
PMV BLD AUTO: 11.2 FL (ref 8.1–13.5)
RBC # BLD: 4.88 M/UL (ref 4.21–5.77)
RBC # BLD: ABNORMAL 10*6/UL
SEG NEUTROPHILS: 77 % (ref 36–65)
SEGMENTED NEUTROPHILS ABSOLUTE COUNT: 11.51 K/UL (ref 1.5–8.1)
TROPONIN INTERP: ABNORMAL
TROPONIN T: ABNORMAL NG/ML
TROPONIN, HIGH SENSITIVITY: 121 NG/L (ref 0–22)
WBC # BLD: 14.7 K/UL (ref 3.5–11.3)
WBC # BLD: ABNORMAL 10*3/UL

## 2020-03-09 PROCEDURE — 2580000003 HC RX 258: Performed by: STUDENT IN AN ORGANIZED HEALTH CARE EDUCATION/TRAINING PROGRAM

## 2020-03-09 PROCEDURE — C1894 INTRO/SHEATH, NON-LASER: HCPCS

## 2020-03-09 PROCEDURE — 85730 THROMBOPLASTIN TIME PARTIAL: CPT

## 2020-03-09 PROCEDURE — 6360000002 HC RX W HCPCS

## 2020-03-09 PROCEDURE — 2580000003 HC RX 258: Performed by: INTERNAL MEDICINE

## 2020-03-09 PROCEDURE — 71101 X-RAY EXAM UNILAT RIBS/CHEST: CPT

## 2020-03-09 PROCEDURE — 2709999900 HC NON-CHARGEABLE SUPPLY

## 2020-03-09 PROCEDURE — 85025 COMPLETE CBC W/AUTO DIFF WBC: CPT

## 2020-03-09 PROCEDURE — 99233 SBSQ HOSP IP/OBS HIGH 50: CPT | Performed by: INTERNAL MEDICINE

## 2020-03-09 PROCEDURE — 97116 GAIT TRAINING THERAPY: CPT

## 2020-03-09 PROCEDURE — 94640 AIRWAY INHALATION TREATMENT: CPT

## 2020-03-09 PROCEDURE — C1769 GUIDE WIRE: HCPCS

## 2020-03-09 PROCEDURE — 84484 ASSAY OF TROPONIN QUANT: CPT

## 2020-03-09 PROCEDURE — 6370000000 HC RX 637 (ALT 250 FOR IP): Performed by: STUDENT IN AN ORGANIZED HEALTH CARE EDUCATION/TRAINING PROGRAM

## 2020-03-09 PROCEDURE — 6360000002 HC RX W HCPCS: Performed by: STUDENT IN AN ORGANIZED HEALTH CARE EDUCATION/TRAINING PROGRAM

## 2020-03-09 PROCEDURE — 2060000000 HC ICU INTERMEDIATE R&B

## 2020-03-09 PROCEDURE — C1725 CATH, TRANSLUMIN NON-LASER: HCPCS

## 2020-03-09 PROCEDURE — 94660 CPAP INITIATION&MGMT: CPT

## 2020-03-09 PROCEDURE — 6370000000 HC RX 637 (ALT 250 FOR IP): Performed by: INTERNAL MEDICINE

## 2020-03-09 PROCEDURE — 6360000004 HC RX CONTRAST MEDICATION

## 2020-03-09 PROCEDURE — 4A023N7 MEASUREMENT OF CARDIAC SAMPLING AND PRESSURE, LEFT HEART, PERCUTANEOUS APPROACH: ICD-10-PCS | Performed by: INTERNAL MEDICINE

## 2020-03-09 PROCEDURE — 2500000003 HC RX 250 WO HCPCS

## 2020-03-09 PROCEDURE — 93458 L HRT ARTERY/VENTRICLE ANGIO: CPT | Performed by: INTERNAL MEDICINE

## 2020-03-09 PROCEDURE — B2151ZZ FLUOROSCOPY OF LEFT HEART USING LOW OSMOLAR CONTRAST: ICD-10-PCS | Performed by: INTERNAL MEDICINE

## 2020-03-09 PROCEDURE — 36415 COLL VENOUS BLD VENIPUNCTURE: CPT

## 2020-03-09 PROCEDURE — B2111ZZ FLUOROSCOPY OF MULTIPLE CORONARY ARTERIES USING LOW OSMOLAR CONTRAST: ICD-10-PCS | Performed by: INTERNAL MEDICINE

## 2020-03-09 PROCEDURE — 6370000000 HC RX 637 (ALT 250 FOR IP): Performed by: NURSE PRACTITIONER

## 2020-03-09 PROCEDURE — C1887 CATHETER, GUIDING: HCPCS

## 2020-03-09 RX ORDER — SODIUM CHLORIDE 450 MG/100ML
INJECTION, SOLUTION INTRAVENOUS CONTINUOUS
Status: DISCONTINUED | OUTPATIENT
Start: 2020-03-09 | End: 2020-03-11

## 2020-03-09 RX ORDER — CARVEDILOL 12.5 MG/1
12.5 TABLET ORAL 2 TIMES DAILY WITH MEALS
Status: DISCONTINUED | OUTPATIENT
Start: 2020-03-09 | End: 2020-03-11

## 2020-03-09 RX ORDER — ACETAMINOPHEN 325 MG/1
650 TABLET ORAL EVERY 4 HOURS PRN
Status: DISCONTINUED | OUTPATIENT
Start: 2020-03-09 | End: 2020-03-11 | Stop reason: HOSPADM

## 2020-03-09 RX ORDER — LIDOCAINE 4 G/G
1 PATCH TOPICAL DAILY
Status: DISCONTINUED | OUTPATIENT
Start: 2020-03-09 | End: 2020-03-11 | Stop reason: HOSPADM

## 2020-03-09 RX ORDER — SODIUM CHLORIDE 0.9 % (FLUSH) 0.9 %
10 SYRINGE (ML) INJECTION PRN
Status: DISCONTINUED | OUTPATIENT
Start: 2020-03-09 | End: 2020-03-11 | Stop reason: HOSPADM

## 2020-03-09 RX ORDER — SODIUM CHLORIDE 0.9 % (FLUSH) 0.9 %
10 SYRINGE (ML) INJECTION EVERY 12 HOURS SCHEDULED
Status: DISCONTINUED | OUTPATIENT
Start: 2020-03-09 | End: 2020-03-11 | Stop reason: HOSPADM

## 2020-03-09 RX ORDER — CARVEDILOL 6.25 MG/1
6.25 TABLET ORAL ONCE
Status: COMPLETED | OUTPATIENT
Start: 2020-03-09 | End: 2020-03-09

## 2020-03-09 RX ADMIN — SODIUM CHLORIDE, PRESERVATIVE FREE 10 ML: 5 INJECTION INTRAVENOUS at 20:22

## 2020-03-09 RX ADMIN — FENTANYL CITRATE 50 MCG: 50 INJECTION, SOLUTION INTRAMUSCULAR; INTRAVENOUS at 07:40

## 2020-03-09 RX ADMIN — DOCUSATE SODIUM 100 MG: 100 CAPSULE, LIQUID FILLED ORAL at 08:40

## 2020-03-09 RX ADMIN — DOCUSATE SODIUM 100 MG: 100 CAPSULE, LIQUID FILLED ORAL at 20:20

## 2020-03-09 RX ADMIN — SODIUM CHLORIDE, PRESERVATIVE FREE 10 ML: 5 INJECTION INTRAVENOUS at 07:41

## 2020-03-09 RX ADMIN — DESMOPRESSIN ACETATE 40 MG: 0.2 TABLET ORAL at 20:20

## 2020-03-09 RX ADMIN — SODIUM CHLORIDE: 4.5 INJECTION, SOLUTION INTRAVENOUS at 20:32

## 2020-03-09 RX ADMIN — ASPIRIN 81 MG: 81 TABLET, CHEWABLE ORAL at 08:40

## 2020-03-09 RX ADMIN — HEPARIN SODIUM 17.99 UNITS/KG/HR: 10000 INJECTION, SOLUTION INTRAVENOUS at 04:30

## 2020-03-09 RX ADMIN — FENTANYL CITRATE 50 MCG: 50 INJECTION, SOLUTION INTRAMUSCULAR; INTRAVENOUS at 04:28

## 2020-03-09 RX ADMIN — CARVEDILOL 12.5 MG: 12.5 TABLET, FILM COATED ORAL at 17:06

## 2020-03-09 RX ADMIN — FENTANYL CITRATE 50 MCG: 50 INJECTION, SOLUTION INTRAMUSCULAR; INTRAVENOUS at 00:12

## 2020-03-09 RX ADMIN — CARVEDILOL 6.25 MG: 6.25 TABLET, FILM COATED ORAL at 12:22

## 2020-03-09 RX ADMIN — CARVEDILOL 6.25 MG: 6.25 TABLET, FILM COATED ORAL at 08:40

## 2020-03-09 RX ADMIN — IPRATROPIUM BROMIDE AND ALBUTEROL SULFATE 1 AMPULE: .5; 3 SOLUTION RESPIRATORY (INHALATION) at 19:46

## 2020-03-09 RX ADMIN — IPRATROPIUM BROMIDE AND ALBUTEROL SULFATE 1 AMPULE: .5; 3 SOLUTION RESPIRATORY (INHALATION) at 12:29

## 2020-03-09 RX ADMIN — PREDNISONE 40 MG: 20 TABLET ORAL at 08:39

## 2020-03-09 RX ADMIN — Medication 10 ML: at 20:21

## 2020-03-09 ASSESSMENT — PAIN DESCRIPTION - DESCRIPTORS: DESCRIPTORS: DISCOMFORT

## 2020-03-09 ASSESSMENT — PAIN SCALES - GENERAL
PAINLEVEL_OUTOF10: 0
PAINLEVEL_OUTOF10: 5
PAINLEVEL_OUTOF10: 5
PAINLEVEL_OUTOF10: 0
PAINLEVEL_OUTOF10: 5
PAINLEVEL_OUTOF10: 6
PAINLEVEL_OUTOF10: 6

## 2020-03-09 ASSESSMENT — PAIN DESCRIPTION - PAIN TYPE
TYPE: ACUTE PAIN

## 2020-03-09 ASSESSMENT — PAIN DESCRIPTION - LOCATION
LOCATION: CHEST
LOCATION: CHEST;RIB CAGE
LOCATION: CHEST

## 2020-03-09 NOTE — PROGRESS NOTES
Ambulation/Increased Activity;Repositioned  Response to Pain Intervention: Patient Satisfied  Multiple Pain Sites: No  Vital Signs  Patient Currently in Pain: Yes       Orientation  Orientation  Overall Orientation Status: Within Functional Limits  Social/Functional History  Social/Functional History  Lives With: Alone  Type of Home: Apartment  Home Layout: Two level(bed/bath upstairs)  Home Access: Stairs to enter without rails  Entrance Stairs - Number of Steps: 1 very small step to enter the building  Bathroom Shower/Tub: Tub/Shower unit  Bathroom Toilet: Standard  ADL Assistance: Independent  Homemaking Assistance: Independent  Homemaking Responsibilities: Yes  Ambulation Assistance: Independent  Transfer Assistance: Independent  Active : Yes  Occupation: Full time employment  Type of occupation: [employer]   Leisure & Hobbies: motorcycles, camping  Cognition   Cognition  Overall Cognitive Status: WFL    Objective  Bed mobility  Comment: CHRISTINE- pt seated EOB upon writer's arrival, retired seated EOB upon writer's exit  Transfers  Sit to Stand: Supervision  Stand to sit: Supervision  Ambulation  Ambulation?: Yes  Ambulation 1  Surface: level tile  Device: No Device  Assistance: Stand by assistance  Gait Deviations: Slow Melanie  Distance: 300ft  Comments: Slow melanie with increased time required to complete. Pt reports \"I just feel weak\". HR 86bpm following ambulation.   Stairs/Curb  Stairs?: No    Plan   Plan  Times per week: 5 visits weekly  Times per day: Daily  Current Treatment Recommendations: Strengthening, ROM, Balance Training, Functional Mobility Training, Transfer Training, Endurance Training, Gait Training, Stair training, Home Exercise Program, Safety Education & Training, Patient/Caregiver Education & Training  Safety Devices  Type of devices: Call light within reach, Gait belt, Patient at risk for falls, Nurse notified, Left in bed(RN present upon writer's exit)  Restraints  Initially in

## 2020-03-09 NOTE — CONSULTS
Attestation signed by      Attending Physician Statement:    I have discussed the care of  Sridhar Gaines , including pertinent history and exam findings, with the Cardiology fellow/resident. I have seen and examined the patient and the key elements of all parts of the encounter have been performed by me. I agree with the assessment, plan and orders as documented by the fellow/resident, after I modified exam findings and plan of treatments, and the final version is my approved version of the assessment. Additional Comments: This is a 64 y.o gentleman resuscitated with immediate ROSC after out-of-hospital cardiac arrest with one AED shock given. Cardiac catheterization and echo show no significant CAD with LVEF 45%. He has made a good recovery and is agreeable to proceeding with implantation of a single chamber ICD either tomorrow or Wednesday. The nature of the problem, risks of recurrent SCD, procedure risks and benefits were reviewed with the patient and his family. [facility]   Consultation Note               Today's Date: 3/9/2020  Patient Name: Sridhar Gaines  Date of admission: 3/6/2020  8:18 PM  Patient's age: 64 y.o., 1960  Admission Dx: Cardiac arrest [facility]) [I46.9]    Reason for Consult:  ICD implantation - secondary prevention, out of hospital cardiac arrest    Requesting Physician: Xu Estrada MD    CHIEF COMPLAINT:  No chief complaint on file. History Obtained From:  patient    HISTORY OF PRESENT ILLNESS:      The patient is a 64 y.o.  male who was admitted to the hospital for Cardiac arrest ([facility] [address] [I46.9]. Patient was in his usual state of health till 3/6/20 when he was found unresponsive at work. CPR was started immediately by bystanders. AED was placed that revealed a shockable rhythm and patient achieved ROSC after defibrillation x1.  Patient reports he experienced some fluttering in his chest 2 days prior while he was driving and that diplopia      PHYSICAL EXAM:      BP (!) 172/91   Pulse 69   Temp 98.1 °F (36.7 °C) (Oral)   Resp 20   Ht 5' 11\" (1.803 m)   Wt (!) 318 lb 12.8 oz (144.6 kg)   SpO2 96%   BMI 44.46 kg/m²    Constitutional and General Appearance: Alert, cooperative, no distress and appears stated age  HEENT: PERRLA, no cervical lymphadenopathy. No masses palpable. Respiratory:  · Resp Auscultation: Fair  respiratory effort. No increased work of breathing. · Clear to auscultation bilaterally  Cardiovascular:  · Normal S1 and S2.   · Jugular venous pulsation Normal  · Peripheral pulses are symmetrical and full   Abdomen:   · Soft  · No tenderness  · Bowel sounds present  Extremities:  · No cyanosis or clubbing  · No lower extremity edema  · Skin: Warm and dry  Neurologic:  · Alert and oriented.   · Moves all extremities well  Psychiatric:  · No abnormalities of mood, affect, memory, mentation, or behavior are noted      DATA:    Diagnostics:      EKG:   Results for orders placed or performed during the hospital encounter of 03/06/20   EKG 12 Lead   Result Value Ref Range    Ventricular Rate 59 BPM    Atrial Rate 59 BPM    P-R Interval 174 ms    QRS Duration 120 ms    Q-T Interval 462 ms    QTc Calculation (Bazett) 457 ms    P Axis 63 degrees    R Axis 15 degrees    T Axis 36 degrees    Narrative    Sinus bradycardia  Non-specific intra-ventricular conduction delay  Borderline ECG  When compared with ECG of 06-MAR-2020 22:45,  No significant change was found       ECHO:   Results for orders placed or performed during the hospital encounter of 03/06/20   ECHO Complete 2D W Doppler W Color    Narrative    Transthoracic Echocardiography Report (TTE)     Patient Name [facility]    Date of Study               03/07/2020                WILNER LOYD      Date of      1960  Gender                      Male   Birth      Age          64 year(s)  Race                              Room Number  0124        Height: 71 inch, 180.34 cm      Corporate ID [id number]    Weight:                     315 pounds, 142.9 kg   #      Patient Acct [de-identified]   BSA:          2.56 m^2      BMI:      43.93   #                                                              kg/m^2      MR #         [id number]     Sonographer                 Laura Carter      Accession #  [id number]   Interpreting Physician      [address] [place] [address]      Fellow                   Referring Nurse                            Practitioner      Interpreting             Referring Physician         Fenton Goltz   Fellow     Additional Comments  Technically difficult study, patient body habitus. Type of Study      TTE procedure:2D Echocardiogram, M-Mode, Doppler, Color Doppler. Procedure Date  Date: 03/07/2020 Start: 11:12 AM    Study Location: [facility]  Technical Quality: Adequate visualization    Indications:LV Function. History / Tech. Comments:  Procedure explained to patient. Smoker  HTN    Patient Status: Inpatient    Height: 71 inches Weight: 315.01 pounds BSA: 2.56 m^2 BMI: 43.93 kg/m^2    CONCLUSIONS    Summary  Left ventricle is normal in size, borderline increased left ventricular wall  thickness, global left ventricular systolic function is mildly reduced,  calculated ejection fraction is 45-50%. Left atrium is mildly dilated. No significant valvular regurgitation or stenosis seen. Signature  ----------------------------------------------------------------------------   Electronically signed by Júnior VelaSonographer) on 03/07/2020 12:05 PM  ----------------------------------------------------------------------------    ----------------------------------------------------------------------------   Electronically signed by Kelli RicciInterpreting physician) on 03/07/2020   01:28 PM  ----------------------------------------------------------------------------  FINDINGS  Left Atrium  Left atrium is mildly dilated.   Inter-atrial septum is intact with no evidence for an atrial septal defect,  by color doppler. Left Ventricle  Left ventricle is normal in size, borderline increased left ventricular wall  thickness, global left ventricular systolic function is low normal,  calculated ejection fraction is 45-50%. Right Atrium  Right atrium is normal in size. Right Ventricle  Normal right ventricular size and function. Mitral Valve  Normal mitral valve structure. No evidence of mitral regurgitation. Aortic Valve  Aortic valve is trileaflet. No evidence of aortic insufficiency or stenosis. Tricuspid Valve  Tricuspid valve was not well visualized. No tricuspid regurgitation was seen. Insignificant tricuspid regurgitation, unable to estimate RVSP. Pulmonic Valve  Pulmonic valve not well visualized but Doppler velocities are normal.  Pericardial Effusion  No significant pericardial effusion is seen. Miscellaneous  Normal aortic root dimension. E/E' average = 9.05. IVC normal diameter & inspiratory collapse indicating normal RA filling  pressure .     M-mode / 2D Measurements & Calculations:      LVIDd:5.6 cm(3.7 - 5.6 cm)        Diastolic QKVKAN:711 ml   RAFV:1.3 cm(0.6 - 1.1 cm)         Systolic KWAHZY:08.3 ml   LVPWd:1 cm(0.6 - 1.1 cm)          Aortic Root:3.7 cm(2.0 - 3.7 cm)                                     LA Dimension: 4.1 cm(1.9 - 4.0 cm)   Calculated LVEF (%): 43.65 %      LA volume/Index: 63.3 ml /25m^2                                     LVOT:2.3 cm                                     RVDd:4.2 cm      Mitral:                                 Aortic      Valve Area (P1/2-Time): 3.06 cm^2       Peak Velocity: 1.48 m/s   Peak E-Wave: 0.76 m/s                   Mean Velocity: 0.97 m/s   Peak A-Wave: 1.21 m/s                   Peak Gradient: 8.76 mmHg   E/A Ratio: 0.63                         Mean Gradient: 4 mmHg   Peak Gradient: 2.3 mmHg   Mean Gradient: 2 mmHg   Deceleration Time: 187 msec             Area (continuity): 3.96 cm^2   P1/2t: 72 msec                          AV VTI: 30.9 cm      Area (continuity): 4.75 cm^2   Mean Velocity: 0.59 m/s                                              Pulmonic:                                              Peak Velocity: 0.99 m/s                                           Peak Gradient: 3.93 mmHg     Diastology / Tissue Doppler  Septal Wall E' velocity:0.08 m/s  Septal Wall E/E':9.8  Lateral Wall E' velocity:0.09 m/s  Lateral Wall E/E':8.3     No results found for this or any previous visit. No results found for this or any previous visit. No results found for this or any previous visit. No procedure found. No results found for this or any previous visit. No results found for this or any previous visit. No results found for this or any previous visit. No results found for this or any previous visit. No results found for this or any previous visit. No results found for this or any previous visit. Stress Test:   No results found for this or any previous visit.      Cardiac Angiography:   Results for orders placed or performed during the hospital encounter of 03/06/20   Cardiac Catheterization    Narrative    Cardiac Diagnostic Report     Demographics      Patient   Lai LOYD   Date of Study         03/09/2020   Name      Date of   1960         Gender                Male   Birth      Age       64 year(s)         Race                        Room      [id number]  Height:               71 inch, 180.34 cm   Number      Corporate [id number]           Weight:               318 pounds, 144.2 kg   ID #      Patient   [id number]          BSA:       2.57 m^2   BMI:       44.35 kg/m^2   Acct #      MR #      [de-identified]            Performing Physician  Sven Skaggs      Accession [id number]          Referring Physician   #      Case ID # [id number]              Assisting Physician     Additional Comments  H&P reviewed and patient examined by performing physician prior to the  procedure on 03/06/2020 at  No changes noted. If changes, see note below. Mallampati Classification 2 / ASA Classification II : per Physician . Patient medications reviewed by Physician prior to procedure. Patient medications reviewed by Physician prior to procedure. Procedure  Procedure Type:     Diagnostic procedure: Lt Heart, Coronary Angio, LVgram     Complications:    - No complication    Indications:    - Cardiac Arrest Ventricular Fibrillation      - Coronary risk factors      - ECG changes     Conclusions      Procedure Summary      Minimal CAD   Overall preserved LV function      Recommendations      MMedical treatments   EP evaluation due to arrhythmia / arrest      Signature      ----------------------------------------------------------------   Electronically signed by Huang Huang(Performing Physician) on   03/09/2020 10:03   ----------------------------------------------------------------      Angiographic Findings      Cardiac Arteries and Lesion Findings     LMCA: Normal 0% stenosis. LAD: Normal 0% stenosis. Mid are 20% stenosis    LCx: Normal 0% stenosis. RCA: Normal 0% stenosis. A small amount of contrast seen in the cusp after  engaging with the conus branch, further angiography suggest normal RCA with  no dissection, and contrast was disappearing gradually. Coronary Tree      Dominance: Right     LV Analysis  LV function assessed as:Normal.  Ejection Fraction  +----------------------------------------------------------------------+---+  ! Method                                                                ! EF%! +----------------------------------------------------------------------+---+  ! LV gram                                                               !50 !  +----------------------------------------------------------------------+---+     LV Segment Contractility      1 - Normal    3 - Mild         5 - Severe       7 - Dyskinesis   hypokinesis      hypokinesis      2 -           4 - Moderate     6 - Akinesis     8 - Aneurysm   Hypokinesis   hypokinesis     Procedure Data  Procedure Start Time: 03/09/2020 09:49. Procedure End Time: 03/09/2020  09:58. The procedure was explained in detail to the patient. Risks, complications  and alternative treatments were reviewed. Written consent was obtained. Diagnostic Cath Status: Urgent    Entry Locations    - Retrograde Percutaneous access was performed through the Right Radial      artery. A 6 Fr sheath was inserted. Hemostasis was successfully obtained      using TR Band. Procedure Medications: - Versed I.V. 2 mg.      - Fentanyl I.V. 50 mcg.      - Lidocaine HCl 1% 10mg/ml S.Q. 5 ml.      - Nitroglycerin I.A. 400 mcg.      - Verapamil I.A. 2.5 mg.      - Heparin I.A. 2000 units. Catheters and Wires:    - 6 Fr. Radial TIG 4.0 was used for Left coronary angiography. - 6 Fr. Radial TIG 4.0 was used for Left ventriculography. - 6 Fr. Radial TIG 4.0 was used for Right coronary angiography. Unable to      cannulate the vessel. - 6Fr 3DRC catheter was used for Right coronary angiography. - 6F Guide Catheter AL 1 was used for Right coronary angiography. Contrast Material:    - Optiray 93205 ml    Fluoroscopy Time: Diagnostic: 2:42 minutes. Total: 2:42 minutes. Estimated Blood Loss: 5 ml. Medical History     Allergies    - *No Known Allergies. Risk Factors      The patient risk factors include:obesity, treated and uncontrolled   hypercholesterolemia, treated hypertension, chronic lung disease, last   creatinine: 0.7 mg/dl, creatinine clearance: 237.54 ml/min, dyslipidemia   and Current - Every day tobacco use. Admission Data  Admission Date: 03/06/2020    Admission Status: Inpatient.        -The patient's anginal syndrome was assessed as CCS II according to the      [place] clinical classification. Hemodynamics      Condition: Baseline Room Air      Estimated: 307. 85Heart Rate: 74 bpm

## 2020-03-09 NOTE — OP NOTE
procedure being done using standard protocol. Note was modified based on my own assessment and treatment.     Skye Pickens MD  [facility]

## 2020-03-09 NOTE — PROGRESS NOTES
Mason Lou, RCPPatient Assessment complete. Cardiac arrest ([facility] [address] [I46.9] . Vitals:    03/09/20 0839   BP:    Pulse: 69   Resp:    Temp:    SpO2:    . Patients home meds are   Prior to Admission medications    Medication Sig Start Date End Date Taking?  Authorizing Provider   terazosin (HYTRIN) 5 MG capsule Take 5 mg by mouth 2 times daily     Historical Provider, MD   lisinopril (PRINIVIL;ZESTRIL) 10 MG tablet Take by mouth 2 times daily     Historical Provider, MD   .  Recent Surgical History: None = 0     Assessment    RR 16  Breath Sounds: Diminished/Expiratory wheezes      · Bronchodilator assessment at level  3  · Hyperinflation assessment at level   · Secretion Management assessment at level    ·   · [x]    Bronchodilator Assessment  BRONCHODILATOR ASSESSMENT SCORE  Score 0 1 2 3 4 5   Breath Sounds   []  Patient Baseline []  No Wheeze good aeration []  Faint, scattered wheezing, good aeration [x]  Expiratory Wheezing and or moderately diminished []  Insp/Exp wheeze and/or very diminished []  Insp/Exp and/ or marked distress   Respiratory Rate   []  Patient Baseline [x]  Less than 20 [x]  Less than 20 []  20-25 []  Greater than 25 []  Greater than 25   Peak flow % of Pred or PB [x]  NA   []  Greater than 90%  []  81-90% []  71-80% []  Less than or equal to 70%  or unable to perform []  Unable due to Respiratory Distress   Dyspnea re []  Patient Baseline [x]  No SOB [x]  No SOB []  SOB on exertion []  SOB min activity []  At rest/acute   e FEV% Predicted       [x]  NA []  Above 69%  []  Unable []  Above 60-69%  []  Unable []  Above 50-59%  []  Unable []  Above 35-49%  []  Unable []  Less than 35%  []  Unable                 []  Hyperinflation Assessment  Score 1 2 3   CXR and Breath Sounds   []  Clear []  No atelectasis  Basilar aeration []  Atelectasis or absent basilar breath sounds   Incentive Spirometry Volume  (Per IBW)   []  Greater than or equal to 15ml/Kg []  less than 15ml/Kg []  less than 15ml/Kg

## 2020-03-09 NOTE — PROGRESS NOTES
Smoking Cessation - topics covered   [x]  Health Risks  [x]  Benefits of Quitting   [x]  Smoking Cessation  []  Patient has no history of tobacco use per note in significant history. []  Patient is former smoker per note in significant history. Patient quit in   []  No need for tobacco cessation education. [x]  Booklet given  [x]  Patient verbalizes understanding. []  Patient denies need for tobacco cessation education. []  Unable to meet with patient today. Will follow up as able.   Yaima Arms  2:19 PM

## 2020-03-09 NOTE — PROGRESS NOTES
[facility]  Progress Note                   Date:   3/9/2020  Patient name: Dav Perry  Date of admission:  3/6/2020  8:18 PM  MRN:   [id number]  YOB: 1960  PCP: Trisha Ashley MD    Reason for Admission: Cardiac arrest [facility]) [I46.9]    Subjective:       Clinical Changes /Abnormalities:Pt. Seen & examined in room with family member at bedside after returning from cath lab. Denies any chest pain or SOB. States he is has \"cough. \" Right radial post cath site CDI with TR-band in place. Tele presently SR. Review of Systems    Medications:   Scheduled Meds:   sodium chloride flush  10 mL Intravenous 2 times per day    predniSONE  40 mg Oral Daily    carvedilol  6.25 mg Oral BID WC    docusate sodium  100 mg Oral BID    atorvastatin  40 mg Oral Nightly    sodium chloride flush  10 mL Intravenous 2 times per day    ipratropium-albuterol  1 ampule Inhalation 4x daily     Continuous Infusions:  CBC:   Recent Labs     03/07/20 0524 03/08/20 0448 03/09/20 0438   WBC 12.3* 12.1* 14.7*   HGB 15.4 14.5 14.6    201 215     BMP:    Recent Labs     03/07/20  0524 03/08/20 0448 03/08/20  1504    138 138   K 3.7 4.0 4.2    102 104   CO2 22 22 23   BUN 18 16 12   CREATININE 0.86 0.79 0.71   GLUCOSE 124* 123* 178*     Hepatic:  Recent Labs     03/06/20  1510 03/07/20 0524 03/08/20 0448   AST 80* 38 22   ALT 66* 53* 40   BILITOT 0.35 0.48 0.39   ALKPHOS 83 62 58     Troponin:   Recent Labs     03/08/20  0906 03/08/20 2010 03/09/20 0438   TROPHS 84* 95* 121*     BNP: No results for input(s): BNP in the last 72 hours. Lipids: No results for input(s): CHOL, HDL in the last 72 hours.     Invalid input(s): LDLCALCU  INR:   Recent Labs     03/07/20 0524 03/08/20 0448   INR 1.1 1.0       Objective:   Vitals: BP (!) 172/91   Pulse 69   Temp 98.1 °F (36.7 °C) (Oral)   Resp 20   Ht 5' 11\" (1.803 m)   Wt (!) 318 lb 12.8 oz (144.6 kg)   SpO2 96%   BMI 44.46 cessation. Questions/concerns addressed. Does not want nicotine patch at this time. 3. Will consult EP for recommendations on AICD for secondary prevention.      Electronically signed by IOANA Cage CNP on 3/9/2020 at 10:52 [address] [facility].  [phone]

## 2020-03-09 NOTE — PROGRESS NOTES
Cardiology evaluation for concerning VT/V. fib with shockable rhythm. An STEMI cannot be ruled out and plan for cardiac cath on Monday. Echo shown EF 45-50% EF with new LV dysfunction.     Currently: Persisting ICU. Patient hemodynamically stable with upper -160 mmHg but not accurate due to insufficient BP cuff. Denies chest pain, palpitations, SOB, dizziness at rest and on exertion. Tolerating oral feeds, good urine output but not measuring accurately. Net positive balance of 3 fluid. On heparin drip. Started on Coreg 6.25 mg this morning. Significant smoking history and wishing to quit without patch/gums. No PFTs in the past and no diagnosed COPD. No heart conditions diagnosed in the past.  Father with history of MI at age 79. No other significant family history. Works at [employer].       OBJECTIVE     Vital Signs:  BP (!) 150/82   Pulse 70   Temp 97.7 °F (36.5 °C) (Oral)   Resp 20   Ht 5' 11\" (1.803 m)   Wt (!) 318 lb 12.8 oz (144.6 kg)   SpO2 90%   BMI 44.46 kg/m²     Temp (24hrs), Av.8 °F (36.6 °C), Min:97.4 °F (36.3 °C), Max:98.1 °F (36.7 °C)    In: 228   Out: 300 [Urine:300]    Physical Exam:  Constitutional: Alert, oriented, cooperative and in no apparent distress. Head:normocephalic and atraumatic. EENT:  Oral mucosa was without erythema, exudates. No thrush was noted. Neck: Supple without thyromegaly. No elevated JVP. Trachea was midline. Respiratory: Chest was symmetrical.  Breath sounds bilaterally were diminished, with prolonged expiratory phase to auscultation. There were no wheezes. No rales. Cardiovascular: Regular S1, S2 without murmurs or added sounds. Abdomen: Bowel sounds present. Soft without organomegaly. No tenderness, rigidity or guarding. Musculoskeletal: Normal curvature of the spine. No spinal tenderness. Extremities: Trace lower extremity edema. No ulcerations, calf tenderness. Skin:  Warm and dry.   Good color, turgor and input(s): CKTOTAL;3  FASTING LIPID PANEL:No results found for: CHOL, HDL, TRIG  LIVER PROFILE:   Recent Labs     03/07/20  0524 03/08/20  0448   AST 38 22   ALT 53* 40   BILITOT 0.48 0.39   ALKPHOS 62 58      MICROBIOLOGY:   Lab Results   Component Value Date/Time    CULTURE NO GROWTH 2 DAYS 03/07/2020 12:27 AM       Imaging:    Xr Chest Portable    Result Date: 3/6/2020  No acute cardiopulmonary process. Xr Chest Portable    Result Date: 3/6/2020  No acute process. Cta Chest W Contrast    Result Date: 3/6/2020  1. Diffuse fatty infiltration of the liver. 2. No pulmonary embolus, aortic dissection or other acute cardiopulmonary abnormality. ASSESSMENT & PLAN   Principal Problem (Resolved):    Cardiac arrest ([facility] [address]  Active Problems:    Dependence on nicotine from cigarettes    Hypertension    Morbid obesity ([facility] [address]    NSTEMI (non-ST elevated myocardial infarction) ([facility] [address]    Obstructive sleep apnea    Acute systolic congestive heart failure (HCC)      ASSESSMENT / PLAN:   1.  S/p cardiac arrest with ? V fib/ V tach: ROSC with CPR, 1 round of defibrillation. 2.  Suspect NSTEMI: Cath shown minimal CAD, but can't rule out acute MI causing collapse. Discontinued heparin drip. Continue ASA, lipitor, Coreg. EP consult for possible AICD as secondary prevention. Cardiology following. 3.  Ruled out Systolic heart failure: Echo with preserved LV function. 4.  Suspect COPD exacerbation: No PFTs available. Prednisone 40 mg for total 5 days. RSV panel negative. Recommended outpatient pulmonology follow-up. 5.  Tobacco abuse 40 PY: Extensive discussion with counseling smoking cessation. Patient wishing to quit without the use of nicotine patch. 6.  REGIS: BiPAP at night and day naps. 7.  Essential hypertension: Started Coreg 6.25 mg twice daily today. Accurate BP measurement and monitor for now. Held home medication lisinopril 10 mg twice daily.        DVT PX: Already on heparin.   GI PX: Not indicated  PT/OT: Consulted  Discharge planning: . Return to home after EP recommendations    Above note copied from Dr. Steve Woodruff residents note dated 3/9/2020    Henry Omer MD  Internal Medicine Resident, PGY-1  [id number]   3/9/2020, 4:50 PM    Attestation and add on       I have discussed the care of Cyrus Hargrove , including pertinent history and exam findings,      3/9/20  with the resident. I have seen and examined the patient and the key elements of all parts of the encounter have been performed by me . I agree with the assessment, plan and orders as documented by the resident. Principal Problem (Resolved):    Cardiac arrest [facility])  Active Problems:    Dependence on nicotine from cigarettes    Hypertension    Morbid obesity ([facility] [address]    NSTEMI (non-ST elevated myocardial infarction) ([facility] [address]    Obstructive sleep apnea    Acute systolic congestive heart failure ([facility] [address]       -Findings:     LMCA: Normal 0% stenosis. LAD: Normal 0% stenosis. Mid are 20% stenosis    LCx: Normal 0% stenosis. RCA: Normal 0% stenosis. A small amount of contrast seen in the cusp after engaging with the conus branch, further angiography suggest normal RCA with no dissection, and contrast was disappearing gradually.           The LV gram was performed in the GIFFORD 30 position. LVEF: 50%. LV Wall Motion: Normal              Conclusions:     Minimal CAD   Overall preserved LV function           Recommendations:     Medical treatments   EP evaluation due to arrhythmia / arrest      --     ---- ;        Medications:      Allergies:  No Known Allergies    Current Meds:   Scheduled Meds:    sodium chloride flush  10 mL Intravenous 2 times per day    carvedilol  12.5 mg Oral BID WC    lidocaine  1 patch Transdermal Daily    predniSONE  40 mg Oral Daily    docusate sodium  100 mg Oral BID    atorvastatin  40 mg Oral Nightly    sodium chloride flush  10 mL Intravenous 2 times per day    ipratropium-albuterol  1

## 2020-03-09 NOTE — PROGRESS NOTES
Cardiology evaluation for concerning VT/V. fib with shockable rhythm. An STEMI cannot be ruled out and plan for cardiac cath on Monday. Echo shown EF 45-50% EF with new LV dysfunction.     Currently: Persisting ICU. Patient hemodynamically stable with upper -160 mmHg but not accurate due to insufficient BP cuff. Denies chest pain, palpitations, SOB, dizziness at rest and on exertion. Tolerating oral feeds, good urine output but not measuring accurately. Net positive balance of 3 fluid. On heparin drip. Started on Coreg 6.25 mg this morning. Significant smoking history and wishing to quit without patch/gums. No PFTs in the past and no diagnosed COPD. No heart conditions diagnosed in the past.  Father with history of MI at age 79. No other significant family history. Works at [employer] store.       OBJECTIVE     Vital Signs:  BP (!) 172/91   Pulse 69   Temp 98.1 °F (36.7 °C) (Oral)   Resp 20   Ht 5' 11\" (1.803 m)   Wt (!) 318 lb 12.8 oz (144.6 kg)   SpO2 96%   BMI 44.46 kg/m²     Temp (24hrs), Av °F (36.7 °C), Min:97.4 °F (36.3 °C), Max:98.6 °F (37 °C)    In: 938   Out: 1275 [Urine:1275]    Physical Exam:  Constitutional: Alert, oriented, cooperative and in no apparent distress. Head:normocephalic and atraumatic. EENT:  Oral mucosa was without erythema, exudates. No thrush was noted. Neck: Supple without thyromegaly. No elevated JVP. Trachea was midline. Respiratory: Chest was symmetrical.  Breath sounds bilaterally were diminished, with prolonged expiratory phase to auscultation. There were no wheezes. No rales. Cardiovascular: Regular S1, S2 without murmurs or added sounds. Abdomen: Bowel sounds present. Soft without organomegaly. No tenderness, rigidity or guarding. Musculoskeletal: Normal curvature of the spine. No spinal tenderness. Extremities: Trace lower extremity edema. No ulcerations, calf tenderness. Skin:  Warm and dry. Good color, turgor and pigmentation. No lesions or scars. Neurological:  No gross muscle weakness. Normal muscle strength and tone. Normal mentation and appropriate behavior and thought process. Medications:  Scheduled Medications:    predniSONE  40 mg Oral Daily    carvedilol  6.25 mg Oral BID WC    docusate sodium  100 mg Oral BID    aspirin  81 mg Oral Daily    atorvastatin  40 mg Oral Nightly    sodium chloride flush  10 mL Intravenous 2 times per day    ipratropium-albuterol  1 ampule Inhalation 4x daily    heparin (porcine)  4,000 Units Intravenous Once     Continuous Infusions:    heparin (porcine) 17.992 Units/kg/hr (03/09/20 0430)     PRN Medicationsdextromethorphan-guaiFENesin, 10 mL, Q6H PRN  sodium chloride flush, 10 mL, PRN  acetaminophen, 650 mg, Q6H PRN    Or  acetaminophen, 650 mg, Q6H PRN  polyethylene glycol, 17 g, Daily PRN  promethazine, 12.5 mg, Q6H PRN    Or  ondansetron, 4 mg, Q6H PRN  potassium chloride, 10 mEq, PRN  magnesium sulfate, 2 g, PRN  albuterol, 2.5 mg, Q4H PRN  fentanNYL, 50 mcg, Q2H PRN  heparin (porcine), 4,000 Units, PRN  heparin (porcine), 2,000 Units, PRN        Diagnostic Labs:  CBC:   Recent Labs     03/07/20 0524 03/08/20 0448 03/09/20  0438   WBC 12.3* 12.1* 14.7*   RBC 5.06 4.78 4.88   HGB 15.4 14.5 14.6   HCT 47.1 44.8 47.4   MCV 93.1 93.7 97.1   RDW 14.0 14.1 13.6    201 215     BMP:   Recent Labs     03/06/20  2223 03/07/20  0524 03/08/20  0448 03/08/20  1504   NA  --  140 138 138   K  --  3.7 4.0 4.2   CL  --  102 102 104   CO2  --  22 22 23   PHOS 3.7 3.8 3.5  --    BUN  --  18 16 12   CREATININE  --  0.86 0.79 0.71     BNP: No results for input(s): BNP in the last 72 hours. PT/INR:   Recent Labs     03/07/20 0524 03/08/20  0448   PROTIME 11.1 10.3   INR 1.1 1.0     APTT:   Recent Labs     03/08/20  0100 03/08/20 0448 03/09/20  0438   APTT 57.9* 70.9* 59.9*     CARDIAC ENZYMES: No results for input(s): CKMB, CKMBINDEX, TROPONINI in the last 72 hours.     Invalid input(s): CKTOTAL;3  FASTING LIPID PANEL:No results found for: CHOL, HDL, TRIG  LIVER PROFILE:   Recent Labs     03/06/20  1510 03/07/20  0524 03/08/20  0448   AST 80* 38 22   ALT 66* 53* 40   BILITOT 0.35 0.48 0.39   ALKPHOS 83 62 58      MICROBIOLOGY:   Lab Results   Component Value Date/Time    CULTURE NO GROWTH 2 DAYS 03/07/2020 12:27 AM       Imaging:    Xr Chest Portable    Result Date: 3/6/2020  No acute cardiopulmonary process. Xr Chest Portable    Result Date: 3/6/2020  No acute process. Cta Chest W Contrast    Result Date: 3/6/2020  1. Diffuse fatty infiltration of the liver. 2. No pulmonary embolus, aortic dissection or other acute cardiopulmonary abnormality. ASSESSMENT & PLAN   Principal Problem (Resolved):    Cardiac arrest ([facility] [address]  Active Problems:    Dependence on nicotine from cigarettes    Hypertension    Morbid obesity ([facility] [address]    NSTEMI (non-ST elevated myocardial infarction) ([facility] [address]    Obstructive sleep apnea    Acute systolic congestive heart failure (HCC)      ASSESSMENT / PLAN:   1.  S/p cardiac arrest with ? V fib/ V tach: ROSC with CPR, 1 round of defibrillation. 2.  Suspect NSTEMI: Cath shown minimal CAD, but can't rule out acute MI causing collapse. Discontinued heparin drip. Continue ASA, lipitor, Coreg. EP consult for possible AICD as secondary prevention. Cardiology following. 3.  Ruled out Systolic heart failure: Echo with preserved LV function. 4.  Suspect COPD exacerbation: No PFTs available. Prednisone 40 mg for total 5 days. RSV panel negative. Recommended outpatient pulmonology follow-up. 5.  Tobacco abuse 40 PY: Extensive discussion with counseling smoking cessation. Patient wishing to quit without the use of nicotine patch. 6.  REGIS: BiPAP at night and day naps. 7.  Essential hypertension: Started Coreg 6.25 mg twice daily today. Accurate BP measurement and monitor for now. Held home medication lisinopril 10 mg twice daily.        DVT PX: Already on heparin.   GI PX: Not indicated  PT/OT: Consulted  Discharge planning: .   Return to home after EP recommendations        Zuly Lindsey MD  Internal Medicine Resident, PGY-1  [facility]  3/9/2020, 8:49 AM

## 2020-03-10 ENCOUNTER — APPOINTMENT (OUTPATIENT)
Dept: GENERAL RADIOLOGY | Age: 60
DRG: 224 | End: 2020-03-10
Attending: INTERNAL MEDICINE
Payer: COMMERCIAL

## 2020-03-10 ENCOUNTER — APPOINTMENT (OUTPATIENT)
Dept: CARDIAC CATH/INVASIVE PROCEDURES | Age: 60
DRG: 224 | End: 2020-03-10
Attending: INTERNAL MEDICINE
Payer: COMMERCIAL

## 2020-03-10 PROBLEM — I42.8 NONISCHEMIC CARDIOMYOPATHY (HCC): Status: ACTIVE | Noted: 2020-03-10

## 2020-03-10 LAB
ABSOLUTE EOS #: 0.12 K/UL (ref 0–0.44)
ABSOLUTE IMMATURE GRANULOCYTE: 0.1 K/UL (ref 0–0.3)
ABSOLUTE LYMPH #: 1.78 K/UL (ref 1.1–3.7)
ABSOLUTE MONO #: 1.11 K/UL (ref 0.1–1.2)
ANION GAP SERPL CALCULATED.3IONS-SCNC: 11 MMOL/L (ref 9–17)
BASOPHILS # BLD: 1 % (ref 0–2)
BASOPHILS ABSOLUTE: 0.07 K/UL (ref 0–0.2)
BUN BLDV-MCNC: 13 MG/DL (ref 6–20)
BUN/CREAT BLD: ABNORMAL (ref 9–20)
CALCIUM SERPL-MCNC: 9.3 MG/DL (ref 8.6–10.4)
CHLORIDE BLD-SCNC: 104 MMOL/L (ref 98–107)
CO2: 26 MMOL/L (ref 20–31)
CREAT SERPL-MCNC: 0.71 MG/DL (ref 0.7–1.2)
DIFFERENTIAL TYPE: ABNORMAL
EOSINOPHILS RELATIVE PERCENT: 1 % (ref 1–4)
GFR AFRICAN AMERICAN: >60 ML/MIN
GFR NON-AFRICAN AMERICAN: >60 ML/MIN
GFR SERPL CREATININE-BSD FRML MDRD: ABNORMAL ML/MIN/{1.73_M2}
GFR SERPL CREATININE-BSD FRML MDRD: ABNORMAL ML/MIN/{1.73_M2}
GLUCOSE BLD-MCNC: 125 MG/DL (ref 70–99)
HCT VFR BLD CALC: 47.8 % (ref 40.7–50.3)
HEMOGLOBIN: 15.7 G/DL (ref 13–17)
IMMATURE GRANULOCYTES: 1 %
LYMPHOCYTES # BLD: 13 % (ref 24–43)
MCH RBC QN AUTO: 30.3 PG (ref 25.2–33.5)
MCHC RBC AUTO-ENTMCNC: 32.8 G/DL (ref 28.4–34.8)
MCV RBC AUTO: 92.1 FL (ref 82.6–102.9)
MONOCYTES # BLD: 8 % (ref 3–12)
NRBC AUTOMATED: 0 PER 100 WBC
PARTIAL THROMBOPLASTIN TIME: 27.6 SEC (ref 20.5–30.5)
PDW BLD-RTO: 13.7 % (ref 11.8–14.4)
PLATELET # BLD: 246 K/UL (ref 138–453)
PLATELET ESTIMATE: ABNORMAL
PMV BLD AUTO: 11.2 FL (ref 8.1–13.5)
POTASSIUM SERPL-SCNC: 3.8 MMOL/L (ref 3.7–5.3)
RBC # BLD: 5.19 M/UL (ref 4.21–5.77)
RBC # BLD: ABNORMAL 10*6/UL
SEG NEUTROPHILS: 76 % (ref 36–65)
SEGMENTED NEUTROPHILS ABSOLUTE COUNT: 10.56 K/UL (ref 1.5–8.1)
SODIUM BLD-SCNC: 141 MMOL/L (ref 135–144)
WBC # BLD: 13.7 K/UL (ref 3.5–11.3)
WBC # BLD: ABNORMAL 10*3/UL

## 2020-03-10 PROCEDURE — 80048 BASIC METABOLIC PNL TOTAL CA: CPT

## 2020-03-10 PROCEDURE — 94640 AIRWAY INHALATION TREATMENT: CPT

## 2020-03-10 PROCEDURE — 02H63KZ INSERTION OF DEFIBRILLATOR LEAD INTO RIGHT ATRIUM, PERCUTANEOUS APPROACH: ICD-10-PCS | Performed by: INTERNAL MEDICINE

## 2020-03-10 PROCEDURE — 2580000003 HC RX 258: Performed by: INTERNAL MEDICINE

## 2020-03-10 PROCEDURE — 6360000002 HC RX W HCPCS

## 2020-03-10 PROCEDURE — 97535 SELF CARE MNGMENT TRAINING: CPT

## 2020-03-10 PROCEDURE — 6370000000 HC RX 637 (ALT 250 FOR IP): Performed by: STUDENT IN AN ORGANIZED HEALTH CARE EDUCATION/TRAINING PROGRAM

## 2020-03-10 PROCEDURE — 71045 X-RAY EXAM CHEST 1 VIEW: CPT

## 2020-03-10 PROCEDURE — 94760 N-INVAS EAR/PLS OXIMETRY 1: CPT

## 2020-03-10 PROCEDURE — C1777 LEAD, AICD, ENDO SINGLE COIL: HCPCS

## 2020-03-10 PROCEDURE — 0JH608Z INSERTION OF DEFIBRILLATOR GENERATOR INTO CHEST SUBCUTANEOUS TISSUE AND FASCIA, OPEN APPROACH: ICD-10-PCS | Performed by: INTERNAL MEDICINE

## 2020-03-10 PROCEDURE — 6370000000 HC RX 637 (ALT 250 FOR IP): Performed by: INTERNAL MEDICINE

## 2020-03-10 PROCEDURE — 2709999900 HC NON-CHARGEABLE SUPPLY

## 2020-03-10 PROCEDURE — 6370000000 HC RX 637 (ALT 250 FOR IP): Performed by: NURSE PRACTITIONER

## 2020-03-10 PROCEDURE — 85025 COMPLETE CBC W/AUTO DIFF WBC: CPT

## 2020-03-10 PROCEDURE — C1894 INTRO/SHEATH, NON-LASER: HCPCS

## 2020-03-10 PROCEDURE — 2060000000 HC ICU INTERMEDIATE R&B

## 2020-03-10 PROCEDURE — 33249 INSJ/RPLCMT DEFIB W/LEAD(S): CPT | Performed by: INTERNAL MEDICINE

## 2020-03-10 PROCEDURE — 2580000003 HC RX 258

## 2020-03-10 PROCEDURE — 99233 SBSQ HOSP IP/OBS HIGH 50: CPT | Performed by: INTERNAL MEDICINE

## 2020-03-10 PROCEDURE — 85730 THROMBOPLASTIN TIME PARTIAL: CPT

## 2020-03-10 PROCEDURE — 2500000003 HC RX 250 WO HCPCS

## 2020-03-10 PROCEDURE — 36415 COLL VENOUS BLD VENIPUNCTURE: CPT

## 2020-03-10 PROCEDURE — C1722 AICD, SINGLE CHAMBER: HCPCS

## 2020-03-10 RX ORDER — ACETAMINOPHEN 325 MG/1
650 TABLET ORAL EVERY 4 HOURS PRN
Status: DISCONTINUED | OUTPATIENT
Start: 2020-03-10 | End: 2020-03-11 | Stop reason: HOSPADM

## 2020-03-10 RX ORDER — SODIUM CHLORIDE 9 MG/ML
INJECTION, SOLUTION INTRAVENOUS CONTINUOUS
Status: DISCONTINUED | OUTPATIENT
Start: 2020-03-10 | End: 2020-03-11

## 2020-03-10 RX ORDER — SODIUM CHLORIDE 0.9 % (FLUSH) 0.9 %
10 SYRINGE (ML) INJECTION PRN
Status: DISCONTINUED | OUTPATIENT
Start: 2020-03-10 | End: 2020-03-11 | Stop reason: HOSPADM

## 2020-03-10 RX ORDER — LISINOPRIL 5 MG/1
5 TABLET ORAL DAILY
Status: DISCONTINUED | OUTPATIENT
Start: 2020-03-10 | End: 2020-03-11

## 2020-03-10 RX ORDER — SODIUM CHLORIDE 0.9 % (FLUSH) 0.9 %
10 SYRINGE (ML) INJECTION EVERY 12 HOURS SCHEDULED
Status: DISCONTINUED | OUTPATIENT
Start: 2020-03-10 | End: 2020-03-11 | Stop reason: HOSPADM

## 2020-03-10 RX ORDER — VANCOMYCIN HYDROCHLORIDE 1 G/200ML
1000 INJECTION, SOLUTION INTRAVENOUS ONCE
Status: DISCONTINUED | OUTPATIENT
Start: 2020-03-10 | End: 2020-03-11 | Stop reason: HOSPADM

## 2020-03-10 RX ADMIN — CARVEDILOL 12.5 MG: 12.5 TABLET, FILM COATED ORAL at 08:27

## 2020-03-10 RX ADMIN — PREDNISONE 40 MG: 20 TABLET ORAL at 08:27

## 2020-03-10 RX ADMIN — IPRATROPIUM BROMIDE AND ALBUTEROL SULFATE 1 AMPULE: .5; 3 SOLUTION RESPIRATORY (INHALATION) at 15:04

## 2020-03-10 RX ADMIN — IPRATROPIUM BROMIDE AND ALBUTEROL SULFATE 1 AMPULE: .5; 3 SOLUTION RESPIRATORY (INHALATION) at 08:33

## 2020-03-10 RX ADMIN — ACETAMINOPHEN 650 MG: 325 TABLET ORAL at 18:04

## 2020-03-10 RX ADMIN — DESMOPRESSIN ACETATE 40 MG: 0.2 TABLET ORAL at 20:59

## 2020-03-10 RX ADMIN — ACETAMINOPHEN 650 MG: 325 TABLET ORAL at 08:20

## 2020-03-10 RX ADMIN — Medication 10 ML: at 20:57

## 2020-03-10 RX ADMIN — DOCUSATE SODIUM 100 MG: 100 CAPSULE, LIQUID FILLED ORAL at 08:27

## 2020-03-10 RX ADMIN — ACETAMINOPHEN 650 MG: 325 TABLET ORAL at 05:10

## 2020-03-10 RX ADMIN — Medication 10 ML: at 08:20

## 2020-03-10 RX ADMIN — CARVEDILOL 12.5 MG: 12.5 TABLET, FILM COATED ORAL at 17:24

## 2020-03-10 ASSESSMENT — PAIN SCALES - GENERAL
PAINLEVEL_OUTOF10: 6
PAINLEVEL_OUTOF10: 5
PAINLEVEL_OUTOF10: 4
PAINLEVEL_OUTOF10: 3
PAINLEVEL_OUTOF10: 4

## 2020-03-10 ASSESSMENT — PAIN DESCRIPTION - LOCATION: LOCATION: RIB CAGE

## 2020-03-10 ASSESSMENT — PAIN DESCRIPTION - ORIENTATION: ORIENTATION: RIGHT

## 2020-03-10 ASSESSMENT — PAIN DESCRIPTION - PAIN TYPE: TYPE: ACUTE PAIN

## 2020-03-10 ASSESSMENT — PAIN DESCRIPTION - PROGRESSION: CLINICAL_PROGRESSION: GRADUALLY IMPROVING

## 2020-03-10 NOTE — PROGRESS NOTES
Occupational Therapy  Facility/Department: [facility] 3  Daily Treatment Note  NAME: Reggie Thakkar  : 1960  MRN: [id number]    Date of Service: 3/10/2020    Discharge Recommendations: No therapy recommended at discharge. Assessment   Performance deficits / Impairments: Decreased functional mobility ; Decreased endurance;Decreased balance;Decreased high-level IADLs;Decreased ADL status  Treatment Diagnosis: AICD placement 3/10/20  Prognosis: Good  REQUIRES OT FOLLOW UP: Yes  Activity Tolerance  Activity Tolerance: Patient Tolerated treatment well;Patient limited by pain  Safety Devices  Safety Devices in place: Yes  Type of devices: All fall risk precautions in place;Call light within reach; Left in chair;Nurse notified         Patient Diagnosis(es): There were no encounter diagnoses. has a past medical history of Alcoholism in recovery, Cardiac arrest, Hx of cardiac catheterization, Hypertension, NSTEMI, and REGIS.   has no past surgical history on file. Restrictions  Restrictions/Precautions  Restrictions/Precautions: Cardiac, Surgical Protocols, Fall Risk, Bedrest with Bathroom Privileges  Required Braces or Orthoses?: (Sling LUE )  Position Activity Restriction  Other position/activity restrictions: AICD placed 3/10/20, LUE-No shoulder flex/abd <90 degrees, No Lifting <5#  Subjective   General  Patient assessed for rehabilitation services?: Yes  Family / Caregiver Present: No (2 daughter just left prior to OT arrival)  Pain Assessment  Pain Assessment: 0-10  Pain Level: 4  Pain Type: Acute pain  Pain Location: Rib cage  Pain Orientation: Right  Clinical Progression: Gradually improving(pt states his pain meds are helping)  Response to Pain Intervention: Patient Satisfied  Vital Signs  Patient Currently in Pain: Yes   Objective    ADL  Feeding: Independent  Grooming: Stand by assistance;Setup; Increased time to complete  UE Dressing: Minimal assistance;Maximum assistance;Setup; Increased time to

## 2020-03-10 NOTE — PLAN OF CARE
Problem: Cardiac:  Goal: Ability to maintain vital signs within normal range will improve  Description: Ability to maintain vital signs within normal range will improve  Outcome: Ongoing     Problem: Cardiac:  Goal: Cardiovascular alteration will improve  Description: Cardiovascular alteration will improve  Outcome: Ongoing     Problem: Health Behavior:  Goal: Will modify at least one risk factor affecting health status  Description: Will modify at least one risk factor affecting health status  Outcome: Ongoing     Problem: Discharge Planning:  Goal: Participates in care planning  Description: Participates in care planning  Outcome: Ongoing     Problem: Anxiety/Stress:  Goal: Level of anxiety will decrease  Description: Level of anxiety will decrease  Outcome: Ongoing     Problem: Cardiac Output - Decreased:  Goal: Hemodynamic stability will improve  Description: Hemodynamic stability will improve  Outcome: Ongoing     Problem: Breathing Pattern - Ineffective:  Goal: Ability to achieve and maintain a regular respiratory rate will improve  Description: Ability to achieve and maintain a regular respiratory rate will improve  Outcome: Ongoing     Problem: Pain:  Goal: Pain level will decrease  Description: Pain level will decrease  Outcome: Ongoing     Problem: Musculor/Skeletal Functional Status  Goal: Highest potential functional level  Outcome: Ongoing     Problem: Musculor/Skeletal Functional Status  Goal: Absence of falls  Outcome: Ongoing

## 2020-03-10 NOTE — OP NOTE
[facility]                Procedure Note  Single Chamber ICD        Gio Benoit (89 y.o., male)  1960      3/10/2020      Procedure: AICD single    Operators:  Primary:   Assistant/ CV Fellow: Indication: Ischemic CM    Pre Procedure Conscious Sedation Data:    ASA Class:    [] I [x] II [] III [] IV    Mallampati Class:  [] I [x] II [] III [] IV     AICD / CRT Indication: Pre procedure review    Documentation to support the medical necessity f procedure. Reason for placement:     [x] Initial [] Recall/Malfunction  [] Upgrade  [] Beyond useful life limit    EF measured by:   [x] Echo [x] Stress Test  [] Muga  [x] Angiography    For Secondary prevention - Complete this section. A.   [] Documented episodes of cardiac arrest due to V.Fib, not due to transient reversible causes, with no irreversible brain damage   (NCD Covered indication I)  [] Documented sustained VT, either spontaneous or induced by EP study, not associated with an acute MI and notdue to a transient or reversible cause; and patient does not have irreversible brain damage from pre existing cerebral disease. (NCD Covered Insication 2). Docment as  []  Ischemic. [] Non Ischemic. For Primary prevention Patients - Complete Section B & C. Section B.  [] Documented familial or inherited conditions with a high risk of life threatening VT (Long QT, HOC). (Cpovered indication 3)  [] Coronary artery disease with documented prior MI annd LVEF < 35%, and inducivble sustained VT or VF at EP study. (NCD Covered Indication 4). [] Documented prior MI and LVEF < 30%. (NCD Covered Indication 5)  [x] Ischemic dilated CM (IDCM), documented prior MI, NYHA class II/III heart failure, and EF < 35%. (NCD Covered Indication 6). [] NIDCM > 3 months, NYHA class II/III heart failure, and LVEF < 35%   (NCD Covered Indication 7 & 9).   [] Documented prior MI and NYHA class IV heart failure and meets all current medicare coverage for CRT device. Section C. (if one or more box is checked, then patient does not meet NCD coverage requirements for primary prevention)  [] NYHA class IV (Covered Indications 4 and 8)  [] Cardiogenic shock or symptomatic hypotension while in a stable baseline rhythm. [] CABG or PTCA within past 3 months  [] Acute MI within past 40 days. [] Patient is unable to give informed consent. [] Symptoms / Findings making them a candidate for coronary revascularization. [] Irreversible brain damage from pre existing cerebral disease.  [] Any disease, other than cardiac disease, with likely survival less than 1 year.  / Rae Donovan Data:        Name    [employer] X          Model # Serial #   Pacer/ICD/Bi-V [id number]   RV-Lead [id number]     All leads test and program parameters documented in chart    · [x] Sedation monitoring  · [] Left Subclavian Angiogram   · [x] RV lead   · [] RA lead   · [] LV lead   · [x] Intra - OP Lead Testing  · [] Coronary Sinus Angiogram   · [x] Pocket   · [x] Generators Implant  · [x] Fluoro time: 3 min      Procedure  After the usual preparation of the left neck and chest, the patient was draped in the usual sterile manner. Local anesthesia was infused below the left clavicle from the midline laterally. An incision was made inferior and parallel to the clavicle. The incision was carried down to the fascia. A pocket was formed inferior using blunt dissection. A thin walled 18 gauge needle was used to puncture the left subclavian vein using the modified Seldinger technique. A guide wire was passed into the right heart under fluoroscopic control. This was repeated with a second guide wire. RV Lead Implant:  A 9 Maur[place] sheath was then passed over the guide wire and the guide wire removed. The ventricular lead was advanced through the sheath into the right heart. The lead was then positioned in the right ventricle under fluoroscopic control.   The acute pacing and sensing thresholds were measured and found to be satisfactory. Generator: The implanted leads were attached to the pacemaker / AICD using the setscrews. The pocket was irrigated with antibiotic solution. The pulse generator and leads were coiled and placed in the pocket. Fluoroscopy was used to verify the final placement of the pacemaker and leads. The pocket was closed using multiple layers of suture and a dry sterile dressing was applied. There were no complications, patient tolerated the procedure well. The patient left the EP lab in stable condition.       Estimated Blood Loss:  [x] Minimal < 25 cc [] Moderate 25-50 cc  [] >50 cc      Impression / Device:  Successful Implantation of: AICD single lead      Plan:  Telemetry monitoring  Interigate pacemaker before discharge  CXR if needed  Wound check at [facility] in 7days  Discharge if patient remains stable        Electronically signed by Jose Bundy MD on 3/10/2020 at 12:36 [facility]  [phone]

## 2020-03-10 NOTE — PROGRESS NOTES
Patient admitted, consent signed and questions answered. Patient ready for procedure. Call light to reach with side rails up 2 of 2. Chest hair clipped. Family  at bedside with patient. History and physical updated.  Chest cleansed with CHG wipes

## 2020-03-10 NOTE — PROGRESS NOTES
cardiac cath on Monday. Echo shown EF 45-50% EF with new LV dysfunction.     Currently: Persisting ICU. Patient hemodynamically stable with upper -160 mmHg but not accurate due to insufficient BP cuff. Denies chest pain, palpitations, SOB, dizziness at rest and on exertion. Tolerating oral feeds, good urine output but not measuring accurately. Net positive balance of 3 fluid. On heparin drip. Started on Coreg 6.25 mg this morning. Significant smoking history and wishing to quit without patch/gums. No PFTs in the past and no diagnosed COPD. No heart conditions diagnosed in the past.  Father with history of MI at age 79. No other significant family history. Works at [employer].       OBJECTIVE     Vital Signs:  BP (!) 153/85   Pulse 78   Temp 97.9 °F (36.6 °C) (Oral)   Resp 15   Ht 5' 11\" (1.803 m)   Wt (!) 321 lb 10.4 oz (145.9 kg)   SpO2 93%   BMI 44.86 kg/m²     Temp (24hrs), Av.2 °F (36.8 °C), Min:97.9 °F (36.6 °C), Max:98.8 °F (37.1 °C)    In: 1636   Out: 2625 [Urine:2625]    Physical Exam:  Constitutional: Alert, oriented, cooperative and in no apparent distress. Head:normocephalic and atraumatic. EENT:  Oral mucosa was without erythema, exudates. No thrush was noted. Neck: Supple without thyromegaly. No elevated JVP. Trachea was midline. Respiratory: Chest was symmetrical.  Breath sounds bilaterally were diminished, with prolonged expiratory phase to auscultation. There were no wheezes. No rales. Cardiovascular: Regular S1, S2 without murmurs or added sounds. Abdomen: Bowel sounds present. Soft without organomegaly. No tenderness, rigidity or guarding. Musculoskeletal: Normal curvature of the spine. No spinal tenderness. Extremities: 2+ lower extremity edema. No ulcerations, calf tenderness. Skin:  Warm and dry. Good color, turgor and pigmentation. No lesions or scars. Neurological:  No gross muscle weakness. Normal muscle strength and tone.  Normal mentation and appropriate behavior and thought process. Medications:  Scheduled Medications:    lisinopril  5 mg Oral Daily    sodium chloride flush  10 mL Intravenous 2 times per day    vancomycin  1,000 mg Intravenous Once    bacitracin in 0.9 % sodium chloride irrigation  50,000 Units Topical Once    sodium chloride flush  10 mL Intravenous 2 times per day    carvedilol  12.5 mg Oral BID WC    lidocaine  1 patch Transdermal Daily    predniSONE  40 mg Oral Daily    docusate sodium  100 mg Oral BID    atorvastatin  40 mg Oral Nightly    sodium chloride flush  10 mL Intravenous 2 times per day    ipratropium-albuterol  1 ampule Inhalation 4x daily     Continuous Infusions:    sodium chloride      sodium chloride 75 mL/hr at 03/09/20 2032     PRN Medicationssodium chloride flush, 10 mL, PRN  sodium chloride flush, 10 mL, PRN  acetaminophen, 650 mg, Q4H PRN  magnesium hydroxide, 30 mL, Daily PRN  dextromethorphan-guaiFENesin, 10 mL, Q6H PRN  sodium chloride flush, 10 mL, PRN  acetaminophen, 650 mg, Q6H PRN    Or  acetaminophen, 650 mg, Q6H PRN  polyethylene glycol, 17 g, Daily PRN  promethazine, 12.5 mg, Q6H PRN    Or  ondansetron, 4 mg, Q6H PRN  potassium chloride, 10 mEq, PRN  magnesium sulfate, 2 g, PRN  albuterol, 2.5 mg, Q4H PRN  fentanNYL, 50 mcg, Q2H PRN        Diagnostic Labs:  CBC:   Recent Labs     03/08/20  0448 03/09/20  0438 03/10/20  0619   WBC 12.1* 14.7* 13.7*   RBC 4.78 4.88 5.19   HGB 14.5 14.6 15.7   HCT 44.8 47.4 47.8   MCV 93.7 97.1 92.1   RDW 14.1 13.6 13.7    215 246     BMP:   Recent Labs     03/08/20  0448 03/08/20  1504 03/10/20  0619    138 141   K 4.0 4.2 3.8    104 104   CO2 22 23 26   PHOS 3.5  --   --    BUN 16 12 13   CREATININE 0.79 0.71 0.71     BNP: No results for input(s): BNP in the last 72 hours.   PT/INR:   Recent Labs     03/08/20  0448   PROTIME 10.3   INR 1.0     APTT:   Recent Labs     03/08/20  0448 03/09/20  0438 03/10/20  0619   APTT 70.9* 59.9* 27.6     CARDIAC ENZYMES: No results for input(s): CKMB, CKMBINDEX, TROPONINI in the last 72 hours. Invalid input(s): CKTOTAL;3  FASTING LIPID PANEL:No results found for: CHOL, HDL, TRIG  LIVER PROFILE:   Recent Labs     03/08/20  0448   AST 22   ALT 40   BILITOT 0.39   ALKPHOS 62      MICROBIOLOGY:   Lab Results   Component Value Date/Time    CULTURE NO GROWTH 3 DAYS 03/07/2020 12:27 AM       Imaging:    Xr Chest Portable    Result Date: 3/6/2020  No acute cardiopulmonary process. Xr Chest Portable    Result Date: 3/6/2020  No acute process. Cta Chest W Contrast    Result Date: 3/6/2020  1. Diffuse fatty infiltration of the liver. 2. No pulmonary embolus, aortic dissection or other acute cardiopulmonary abnormality. ASSESSMENT & PLAN   Principal Problem (Resolved):    Cardiac arrest ([facility] [address]  Active Problems:    Dependence on nicotine from cigarettes    Hypertension    Morbid obesity ([facility] [address]    NSTEMI (non-ST elevated myocardial infarction) ([facility] [address]    Obstructive sleep apnea    Acute systolic congestive heart failure (HCC)      ASSESSMENT / PLAN:   1.  S/p cardiac arrest: ROSC with CPR, 1 round of defibrillation. 2.  Non ischemic Cardiomyopathy: EP evaluated. AICD  Placement today for secondary prevention. 3.  CHFrEF: Continue ASA, lipitor, Coreg 12.5 mg BID. Cardiology following. 4.  Suspect COPD exacerbation: No PFTs available. Prednisone 40 mg for total 5 days. RSV panel negative. Recommended outpatient pulmonology follow-up. 5.  Tobacco abuse 40 PY: Continue smoking cessation. Patient wishing to quit without the use of nicotine patch. 6.  REGIS: BiPAP at night and day naps. 7.  Essential hypertension: Increased Coreg 12.5 mg twice daily today.      DVT PX: Already on heparin. GI PX: Not indicated  PT/OT: Consulted  Discharge planning: .   Return to home after EP recommendations      Hussain Diaz MD  Internal Medicine Resident, PGY-1  [facility]  [facility] [address] [place] [address], [address] [place] [address].    Phone [phone]   Fax: [phone]  Answering Service: [phone]

## 2020-03-10 NOTE — PROGRESS NOTES
[facility]  Progress Note                   Date:   3/10/2020  Patient name: Koki Kinney  Date of admission:  3/6/2020  8:18 PM  MRN:   [id number]  YOB: 1960  PCP: Lind Prader, MD    Reason for Admission: Cardiac arrest [facility]) [I46.9]    Subjective:       Clinical Changes /Abnormalities:Pt. Seen & examined in room with family member at bedside. Pt denies any CP or SOB. He states that he is sore. Review of Systems    Medications:   Scheduled Meds:   sodium chloride flush  10 mL Intravenous 2 times per day    carvedilol  12.5 mg Oral BID WC    lidocaine  1 patch Transdermal Daily    predniSONE  40 mg Oral Daily    docusate sodium  100 mg Oral BID    atorvastatin  40 mg Oral Nightly    sodium chloride flush  10 mL Intravenous 2 times per day    ipratropium-albuterol  1 ampule Inhalation 4x daily     Continuous Infusions:   sodium chloride 75 mL/hr at 03/09/20 2032     CBC:   Recent Labs     03/08/20 0448 03/09/20 0438 03/10/20  0619   WBC 12.1* 14.7* 13.7*   HGB 14.5 14.6 15.7    215 246     BMP:    Recent Labs     03/08/20 0448 03/08/20  1504 03/10/20  0619    138 141   K 4.0 4.2 3.8    104 104   CO2 22 23 26   BUN 16 12 13   CREATININE 0.79 0.71 0.71   GLUCOSE 123* 178* 125*     Hepatic:  Recent Labs     03/08/20 0448   AST 22   ALT 40   BILITOT 0.39   ALKPHOS 58     Troponin:   Recent Labs     03/08/20  0906 03/08/20 2010 03/09/20 0438   TROPHS 84* 95* 121*     BNP: No results for input(s): BNP in the last 72 hours. Lipids: No results for input(s): CHOL, HDL in the last 72 hours.     Invalid input(s): LDLCALCU  INR:   Recent Labs     03/08/20 0448   INR 1.0       Objective:   Vitals: BP (!) 153/85   Pulse 78   Temp 97.9 °F (36.6 °C) (Oral)   Resp 16   Ht 5' 11\" (1.803 m)   Wt (!) 321 lb 10.4 oz (145.9 kg)   SpO2 93%   BMI 44.86 kg/m²   General appearance: alert and cooperative with exam  HEENT: Head: Normocephalic, no time.  4. Per EP, plan for ACID today.   Pt currently NPO     Electronically signed by IOANA Mathews CNP on 3/10/2020 at [address] [place] [address] N.  [phone]

## 2020-03-11 ENCOUNTER — APPOINTMENT (OUTPATIENT)
Dept: GENERAL RADIOLOGY | Age: 60
DRG: 224 | End: 2020-03-11
Attending: INTERNAL MEDICINE
Payer: COMMERCIAL

## 2020-03-11 VITALS
WEIGHT: 315 LBS | OXYGEN SATURATION: 96 % | DIASTOLIC BLOOD PRESSURE: 90 MMHG | SYSTOLIC BLOOD PRESSURE: 178 MMHG | HEART RATE: 74 BPM | BODY MASS INDEX: 44.1 KG/M2 | RESPIRATION RATE: 16 BRPM | HEIGHT: 71 IN | TEMPERATURE: 97.5 F

## 2020-03-11 LAB
ABSOLUTE EOS #: 0.13 K/UL (ref 0–0.44)
ABSOLUTE IMMATURE GRANULOCYTE: 0.11 K/UL (ref 0–0.3)
ABSOLUTE LYMPH #: 1.9 K/UL (ref 1.1–3.7)
ABSOLUTE MONO #: 1.18 K/UL (ref 0.1–1.2)
BASOPHILS # BLD: 1 % (ref 0–2)
BASOPHILS ABSOLUTE: 0.1 K/UL (ref 0–0.2)
DIFFERENTIAL TYPE: ABNORMAL
EOSINOPHILS RELATIVE PERCENT: 1 % (ref 1–4)
HCT VFR BLD CALC: 47.3 % (ref 40.7–50.3)
HEMOGLOBIN: 15.4 G/DL (ref 13–17)
IMMATURE GRANULOCYTES: 1 %
LYMPHOCYTES # BLD: 14 % (ref 24–43)
MCH RBC QN AUTO: 30.1 PG (ref 25.2–33.5)
MCHC RBC AUTO-ENTMCNC: 32.6 G/DL (ref 28.4–34.8)
MCV RBC AUTO: 92.6 FL (ref 82.6–102.9)
MONOCYTES # BLD: 8 % (ref 3–12)
NRBC AUTOMATED: 0 PER 100 WBC
PDW BLD-RTO: 13.6 % (ref 11.8–14.4)
PLATELET # BLD: 254 K/UL (ref 138–453)
PLATELET ESTIMATE: ABNORMAL
PMV BLD AUTO: 11 FL (ref 8.1–13.5)
RBC # BLD: 5.11 M/UL (ref 4.21–5.77)
RBC # BLD: ABNORMAL 10*6/UL
SEG NEUTROPHILS: 75 % (ref 36–65)
SEGMENTED NEUTROPHILS ABSOLUTE COUNT: 10.59 K/UL (ref 1.5–8.1)
WBC # BLD: 14 K/UL (ref 3.5–11.3)
WBC # BLD: ABNORMAL 10*3/UL

## 2020-03-11 PROCEDURE — 6370000000 HC RX 637 (ALT 250 FOR IP): Performed by: STUDENT IN AN ORGANIZED HEALTH CARE EDUCATION/TRAINING PROGRAM

## 2020-03-11 PROCEDURE — 85025 COMPLETE CBC W/AUTO DIFF WBC: CPT

## 2020-03-11 PROCEDURE — 94760 N-INVAS EAR/PLS OXIMETRY 1: CPT

## 2020-03-11 PROCEDURE — 6370000000 HC RX 637 (ALT 250 FOR IP): Performed by: NURSE PRACTITIONER

## 2020-03-11 PROCEDURE — 99239 HOSP IP/OBS DSCHRG MGMT >30: CPT | Performed by: INTERNAL MEDICINE

## 2020-03-11 PROCEDURE — 71045 X-RAY EXAM CHEST 1 VIEW: CPT

## 2020-03-11 PROCEDURE — 36415 COLL VENOUS BLD VENIPUNCTURE: CPT

## 2020-03-11 PROCEDURE — 94640 AIRWAY INHALATION TREATMENT: CPT

## 2020-03-11 PROCEDURE — 6370000000 HC RX 637 (ALT 250 FOR IP): Performed by: INTERNAL MEDICINE

## 2020-03-11 RX ORDER — LISINOPRIL 10 MG/1
10 TABLET ORAL DAILY
Qty: 30 TABLET | Refills: 3 | Status: SHIPPED | OUTPATIENT
Start: 2020-03-12

## 2020-03-11 RX ORDER — GUAIFENESIN DEXTROMETHORPHAN HYDROBROMIDE ORAL SOLUTION 10; 100 MG/5ML; MG/5ML
10 SOLUTION ORAL EVERY 6 HOURS PRN
Qty: 50 ML | Refills: 0 | Status: CANCELLED | OUTPATIENT
Start: 2020-03-11

## 2020-03-11 RX ORDER — CARVEDILOL 12.5 MG/1
12.5 TABLET ORAL 2 TIMES DAILY WITH MEALS
Qty: 60 TABLET | Refills: 3 | Status: CANCELLED | OUTPATIENT
Start: 2020-03-11

## 2020-03-11 RX ORDER — CARVEDILOL 12.5 MG/1
12.5 TABLET ORAL ONCE
Status: COMPLETED | OUTPATIENT
Start: 2020-03-11 | End: 2020-03-11

## 2020-03-11 RX ORDER — PSEUDOEPHEDRINE HCL 30 MG
100 TABLET ORAL 2 TIMES DAILY
Qty: 60 CAPSULE | Refills: 1 | Status: SHIPPED | OUTPATIENT
Start: 2020-03-11 | End: 2020-04-01 | Stop reason: ALTCHOICE

## 2020-03-11 RX ORDER — CARVEDILOL 25 MG/1
25 TABLET ORAL 2 TIMES DAILY WITH MEALS
Qty: 60 TABLET | Refills: 3 | Status: SHIPPED | OUTPATIENT
Start: 2020-03-11

## 2020-03-11 RX ORDER — LISINOPRIL 10 MG/1
10 TABLET ORAL DAILY
Status: DISCONTINUED | OUTPATIENT
Start: 2020-03-12 | End: 2020-03-11

## 2020-03-11 RX ORDER — LIDOCAINE 4 G/G
1 PATCH TOPICAL DAILY
Qty: 2 PATCH | Refills: 1 | Status: SHIPPED | OUTPATIENT
Start: 2020-03-11 | End: 2020-04-01

## 2020-03-11 RX ORDER — CARVEDILOL 25 MG/1
25 TABLET ORAL 2 TIMES DAILY WITH MEALS
Status: DISCONTINUED | OUTPATIENT
Start: 2020-03-11 | End: 2020-03-11 | Stop reason: HOSPADM

## 2020-03-11 RX ORDER — LISINOPRIL 10 MG/1
10 TABLET ORAL DAILY
Status: DISCONTINUED | OUTPATIENT
Start: 2020-03-11 | End: 2020-03-11 | Stop reason: HOSPADM

## 2020-03-11 RX ORDER — TRAMADOL HYDROCHLORIDE 50 MG/1
50 TABLET ORAL EVERY 8 HOURS PRN
Qty: 9 TABLET | Refills: 0 | Status: SHIPPED | OUTPATIENT
Start: 2020-03-11 | End: 2020-03-14

## 2020-03-11 RX ORDER — LISINOPRIL 5 MG/1
5 TABLET ORAL DAILY
Qty: 30 TABLET | Refills: 3 | Status: CANCELLED | OUTPATIENT
Start: 2020-03-11

## 2020-03-11 RX ADMIN — ACETAMINOPHEN 650 MG: 325 TABLET ORAL at 02:34

## 2020-03-11 RX ADMIN — CARVEDILOL 12.5 MG: 12.5 TABLET, FILM COATED ORAL at 09:47

## 2020-03-11 RX ADMIN — LISINOPRIL 10 MG: 5 TABLET ORAL at 09:46

## 2020-03-11 RX ADMIN — Medication 10 ML: at 10:48

## 2020-03-11 RX ADMIN — ACETAMINOPHEN 650 MG: 325 TABLET ORAL at 07:43

## 2020-03-11 RX ADMIN — IPRATROPIUM BROMIDE AND ALBUTEROL SULFATE 1 AMPULE: .5; 3 SOLUTION RESPIRATORY (INHALATION) at 11:39

## 2020-03-11 RX ADMIN — IPRATROPIUM BROMIDE AND ALBUTEROL SULFATE 1 AMPULE: .5; 3 SOLUTION RESPIRATORY (INHALATION) at 08:09

## 2020-03-11 RX ADMIN — PREDNISONE 40 MG: 20 TABLET ORAL at 09:46

## 2020-03-11 RX ADMIN — CARVEDILOL 12.5 MG: 12.5 TABLET, FILM COATED ORAL at 07:42

## 2020-03-11 ASSESSMENT — PAIN SCALES - GENERAL
PAINLEVEL_OUTOF10: 4
PAINLEVEL_OUTOF10: 2

## 2020-03-11 NOTE — PLAN OF CARE
PROVIDE ADEQUATE OXYGENATION WITH ACCEPTABLE SP02/ABG'S    [x]  IDENTIFY APPROPRIATE OXYGEN THERAPY  [x]   MONITOR SP02/ABG'S AS NEEDED   [x]   PATIENT EDUCATION AS NEEDED  BRONCHOSPASM/BRONCHOCONSTRICTION     [x]         IMPROVE AERATION/BREATH SOUNDS  [x]   ADMINISTER BRONCHODILATOR THERAPY AS APPROPRIATE  [x]   ASSESS BREATH SOUNDS  []   IMPLEMENT AEROSOL/MDI PROTOCOL  [x]   PATIENT EDUCATION AS NEEDED       MACIE LarryPPatient Assessment complete. Cardiac arrest ([facility] [address] [I46.9] . Vitals:    03/11/20 0742   BP: (!) 171/97   Pulse:    Resp:    Temp:    SpO2:    . Patients home meds are   Prior to Admission medications    Medication Sig Start Date End Date Taking?  Authorizing Provider   terazosin (HYTRIN) 5 MG capsule Take 5 mg by mouth 2 times daily     Historical Provider, MD   lisinopril (PRINIVIL;ZESTRIL) 10 MG tablet Take by mouth 2 times daily     Historical Provider, MD   .      Assessment :  No SOB  Pt does wear CPAP at home  Pt does not wear O2 at home  Pt does not take at home meds      RR: 18    Breath Sounds: diminished      Bronchodilator assessment at level  2  Hyperinflation assessment at level   Secretion Management assessment at level      [x]    Bronchodilator Assessment  BRONCHODILATOR ASSESSMENT SCORE  Score 0 1 2 3 4 5   Breath Sounds   []  Patient Baseline []  No Wheeze good aeration []  Faint, scattered wheezing, good aeration [x]  Expiratory Wheezing and or moderately diminished []  Insp/Exp wheeze and/or very diminished []  Insp/Exp and/ or marked distress   Respiratory Rate   []  Patient Baseline []  Less than 20 [x]  Less than 20 []  20-25 []  Greater than 25 []  Greater than 25   Peak flow % of Pred or PB [x]  NA   []  Greater than 90%  []  81-90% []  71-80% []  Less than or equal to 70%  or unable to perform []  Unable due to Respiratory Distress   Dyspnea re []  Patient Baseline [x]  No SOB [x]  No SOB []  SOB on exertion []  SOB min activity []  At rest/acute   e FEV% Predicted       [x]  NA []  Above 69%  []  Unable []  Above 60-69%  []  Unable []  Above 50-59%  []  Unable []  Above 35-49%  []  Unable []  Less than 35%  []  Unable                 []  Hyperinflation Assessment  Score 1 2 3   CXR and Breath Sounds   []  Clear []  No atelectasis  Basilar aeration []  Atelectasis or absent basilar breath sounds   Incentive Spirometry Volume  (Per IBW)   []  Greater than or equal to 15ml/Kg []  less than 15ml/Kg []  less than 15ml/Kg   Surgery within last 2 weeks []  None or general   []  Abdominal or thoracic surgery  []  Abdominal or thoracic   Chronic Pulmonary Historyre []  No []  Yes []  Yes     []  Secretion Management Assessment  Score 1 2 3   Bilateral Breath Sounds   []  Occasional Rhonchi []  Scattered Rhonchi []  Course Rhonchi and/or poor aeration   Sputum    []  Small amount of thin secretions []  Moderate amount of viscous secretions []  Copius, Viscious Yellow/ Secretions   CXR as reported by physician []  clear  []  Unavailable []  Infiltrates and/or consolidation  []  Unavailable []  Mucus Plugging and or lobar consolidation  []  Unavailable   Cough []  Strong, productive cough []  Weak productive cough []  No cough or weak non-productive cough   [facility]  8:18 AM                            FEMALE                                  MALE                            FEV1 Predicted Normal Values                        FEV1 Predicted Normal Values          Age                                     Height in Feet and Inches       Age                                     Height in Feet and Inches       4' 11\" 5' 1\" 5' 3\" 5' 5\" 5' 7\" 5' 9\" 5' 11\" 6' 1\"  4' 11\" 5' 1\" 5' 3\" 5' 5\" 5' 7\" 5' 9\" 5' 11\" 6' 1\"   42 - 45 2.49 2.66 2.84 3.03 3.22 3.42 3.62 3.83 42 - 45 2.82 3.03 3.26 3.49 3.72 3.96 4.22 4.47   46 - 49 2.40 2.57 2.76 2.94 3.14 3.33 3.54 3.75 46 - 49 2.70 2.92 3.14 3.37 3.61 3.85 4.10 4.36   50 - 53 2.31 2.48 2.66 2.85 3.04 3.24 3.45 3.66 50 - 53 2.58 2.80 3.02 3.25 3. 49 3.73 3.98 4.24   54 - 57 2.21 2.38 2.57 2.75 2.95 3.14 3.35 3.56 54 - 57 2.46 2.67 2.89 3.12 3.36 3.60 3.85 4.11   58 - 61 2.10 2.28 2.46 2.65 2.84 3.04 3.24 3.45 58 - 61 2.32 2.54 2.76 2.99 3.23 3.47 3.72 3.98   62 - 65 1.99 2.17 2.35 2.54 2.73 2.93 3.13 3.34 62 - 65 2.19 2.40 2.62 2.85 3.09 3.33 3.58 3.84   66 - 69 1.88 2.05 2.23 2.42 2.61 2.81 3.02 3.23 66 - 69 2.04 2.26 2.48 2.71 2.95 3.19 3.44 3.70   70+ 1.82 1.99 2.17 2.36 2.55 2.75 2.95 3.16 70+ 1.97 2.19 2.41 2.64 2.87 3.12 3.37 3.62             Predicted Peak Expiratory Flow Rate                                       Height (in)  Female       Height (in) Male           Age 64 63 56 57 57 66 78 79 Age            21 344 357 372 387 402 417 432 446  60 62 64 66 68 70 72 74 76   25 337 352 366 381 396 411 426 441 25 447 476 [phone]   30 329 344 359 374 389 404 419 434 30 437 466 494 523 [phone] 667   35 322 337 351 366 381 396 411 426 35 426 455 484 512 541 [phone]   40 314 329 344 359 374 389 404 419 40 416 445 473 502 531 559 588 617 647   45 307 322 336 351 366 381 396 411 45 405 434 463 491 520 549 577 606 636   50 299 314 329 344 359 374 389 404 50 395 424 452 481 510 538 567 596 625   55 292 307 321 336 351 366 381 396 55 384 413 442 470 499 528 556 585 615   60 284 299 314 329 344 359 374 389 60 374 403 431 460 489 517 546 575 605   65 277 292 306 321 336 351 366 381 65 363 392 421 449 478 507 535 564 594   70 269 284 299 314 329 344 359 374 70 353 382 410 439 468 496 525 554 583   75 261 274 289 305 319 334 348 364 75 344 372 400 429 458 487 515 544 573   80 253 266 282 296 312 327 342 356 80 335 362 390 419 448 476 505 534 562               PATIENT EDUCATION AS NEEDED

## 2020-03-11 NOTE — PLAN OF CARE
Problem: Cardiac:  Goal: Ability to maintain vital signs within normal range will improve  Description: Ability to maintain vital signs within normal range will improve  3/10/2020 2233 by Yaneth Siegel RN  Outcome: Ongoing  3/10/2020 1950 by Az Perla RN  Outcome: Ongoing  Goal: Cardiovascular alteration will improve  Description: Cardiovascular alteration will improve  3/10/2020 2233 by Yaneth Siegel RN  Outcome: Ongoing  3/10/2020 1950 by Az Perla RN  Outcome: Ongoing     Problem: Health Behavior:  Goal: Will modify at least one risk factor affecting health status  Description: Will modify at least one risk factor affecting health status  3/10/2020 2233 by Yaneth Siegel RN  Outcome: Ongoing  3/10/2020 1950 by Az Perla RN  Outcome: Ongoing  Goal: Identification of resources available to assist in meeting health care needs will improve  Description: Identification of resources available to assist in meeting health care needs will improve  3/10/2020 2233 by Yaneth Siegel RN  Outcome: Ongoing  3/10/2020 1950 by Az Perla RN  Outcome: Ongoing     Problem: Physical Regulation:  Goal: Complications related to the disease process, condition or treatment will be avoided or minimized  Description: Complications related to the disease process, condition or treatment will be avoided or minimized  3/10/2020 2233 by Yaneth Siegel RN  Outcome: Ongoing  3/10/2020 1950 by Az Perla RN  Outcome: Ongoing     Problem: Discharge Planning:  Goal: Participates in care planning  Description: Participates in care planning  3/10/2020 2233 by Yaneth Siegel RN  Outcome: Ongoing  3/10/2020 1950 by Az Perla RN  Outcome: Ongoing  Goal: Discharged to appropriate level of care  Description: Discharged to appropriate level of care  3/10/2020 2233 by Yaneth Siegel RN  Outcome: Ongoing  3/10/2020 1950 by Nguyễn Thacker Jessica Chow RN  Outcome: Ongoing     Problem: Anxiety/Stress:  Goal: Level of anxiety will decrease  Description: Level of anxiety will decrease  3/10/2020 2233 by Radha Ozuna RN  Outcome: Ongoing  3/10/2020 1950 by Osiris Guzman RN  Outcome: Ongoing     Problem: Cardiac Output - Decreased:  Goal: Hemodynamic stability will improve  Description: Hemodynamic stability will improve  3/10/2020 2233 by Radha Ozuna RN  Outcome: Ongoing  3/10/2020 1950 by Osiris Guzman RN  Outcome: Ongoing     Problem: Fluid Volume - Imbalance:  Goal: Absence of imbalanced fluid volume signs and symptoms  Description: Absence of imbalanced fluid volume signs and symptoms  3/10/2020 2233 by Radha Ozuna RN  Outcome: Ongoing  3/10/2020 1950 by Osiris Guzman RN  Outcome: Ongoing     Problem: Breathing Pattern - Ineffective:  Goal: Ability to achieve and maintain a regular respiratory rate will improve  Description: Ability to achieve and maintain a regular respiratory rate will improve  3/10/2020 2233 by Radha Ozuna RN  Outcome: Ongoing  3/10/2020 1950 by Osiris Guzman RN  Outcome: Ongoing     Problem: Pain:  Goal: Pain level will decrease  Description: Pain level will decrease  3/10/2020 2233 by Radha Ozuna RN  Outcome: Ongoing  3/10/2020 1950 by Osiris Guzman RN  Outcome: Ongoing     Problem: Musculor/Skeletal Functional Status  Goal: Highest potential functional level  3/10/2020 2233 by Radha Ozuna RN  Outcome: Ongoing  3/10/2020 1950 by Osiris Guzman RN  Outcome: Ongoing  Goal: Absence of falls  3/10/2020 2233 by Radha Ozuna RN  Outcome: Ongoing  3/10/2020 1950 by Osiris Guzman RN  Outcome: Ongoing

## 2020-03-11 NOTE — PROGRESS NOTES
[facility]  Internal Medicine Residency Program  Inpatient Daily Progress Note  ______________________________________________________________________________    Patient: Dav Perry  YOB: 1960   CDM:[id number]    Acct: [de-identified]     Admit date: 3/6/2020  Today's date: 03/11/20  Number of days in the hospital: 5       Admitting Diagnosis: Nonischemic cardiomyopathy ([facility] [address]  CC: post cardiac arrest, found unresponsive  Subjective:   Pt examined at bedside. Chart &results reviewed. No acute events reported. Patient fitted with AICD yesterday. He reports some discomfort over AICD site with swelling. He denies shortness of breath, palpitations, lightheadedness. Review of Systems -   General ROS: Completed and except as mentioned above were negative   Psychological ROS:  Completed and except as mentioned above were negative  Allergy and Immunology ROS:  Completed and except as mentioned above were negative  Hematological and Lymphatic ROS:  Completed and except as mentioned above were negative  Respiratory ROS:  Completed and except as mentioned above were negative  Cardiovascular ROS:  Completed and except as mentioned above were negative  Gastrointestinal ROS: Completed and except as mentioned above were negative  Genito-Urinary ROS:  Completed and except as mentioned above were negative  Musculoskeletal ROS:  Completed and except as mentioned above were negative  Neurological ROS:  Completed and except as mentioned above were negative  Dermatological ROS:  Completed and except as mentioned above were negative    Objective:   Vital Sign:  BP (!) 168/86   Pulse 61   Temp 97.9 °F (36.6 °C) (Oral)   Resp 15   Ht 5' 11\" (1.803 m)   Wt (!) 323 lb 9.6 oz (146.8 kg)   SpO2 94%   BMI 45.13 kg/m²       In: -   Out: 350 [Urine:350]    Physical Exam:  Constitutional: alert and oriented. Not in obvious distress. Sat in chair.   Head:normocephalic and atraumatic. EENT:  MARIA T. No conjunctival injections. Neck: Supple without thyromegaly. No elevated JVP. Trachea was midline. Respiratory: Chest was symmetrical without dullness to percussion. Breath sounds bilaterally were clear to auscultation. There were no wheezes, rhonchi or rales. Cardiovascular: dressing over left chest, no follow through, no undue tenderness, regular rate and rhythm without murmur, clicks, gallops or rubs. Abdomen: normal bowel sounds, soft, nontender without organomegaly. No rebound, rigidity or guarding was appreciated. Lymphatic: No palpable lymphadenopathy. Musculoskeletal: Normal curvature of the spine. No gross muscle weakness. Extremities:  No lower extremity edema, ulcerations, tenderness, varicosities or erythema. Muscle size, tone and strength are normal.  No involuntary movements are noted. Skin:  Warm and dry. Good color, turgor and pigmentation. No lesions or scars.   No cyanosis or clubbing  Neurological/Psychiatric: The patient's general behavior, level of consciousness, thought content and emotional status is normal.        Medications:  Scheduled Medications   carvedilol  25 mg Oral BID WC    lisinopril  10 mg Oral Daily    sodium chloride flush  10 mL Intravenous 2 times per day    vancomycin  1,000 mg Intravenous Once    bacitracin in 0.9 % sodium chloride irrigation  50,000 Units Topical Once    sodium chloride flush  10 mL Intravenous 2 times per day    sodium chloride flush  10 mL Intravenous 2 times per day    lidocaine  1 patch Transdermal Daily    predniSONE  40 mg Oral Daily    docusate sodium  100 mg Oral BID    atorvastatin  40 mg Oral Nightly    sodium chloride flush  10 mL Intravenous 2 times per day    ipratropium-albuterol  1 ampule Inhalation 4x daily       PRN Medicationssodium chloride flush, 10 mL, PRN  sodium chloride flush, 10 mL, PRN  acetaminophen, 650 mg, Q4H PRN  sodium chloride flush, 10 mL, failure [facility])  Resolved Problems:    Cardiac arrest ([facility] [address]      Assessment/ Plan:  1. Nonischemic cardiomyopathy. S/p AICD on 3/10/2020. Stable. Pacer interrogated today. IVF dc. Repeat CXR with no pneumothorax or acute process. 2. Post Cardiac arrest. Had CPR and defibrillation x 1.  3. REGIS. CPAP at night and daytime naps. 4. Essential hypertension. Uncontrolled BP. Coreg increased to 25 mg bid. Resumed Lisinopril at home dose. DVT ppx : heparin  GI ppx: not indicated    PT/OT: patient has independent needs. Ambulating freely. Discharge Planning: Will discharge home today. Follow up with cardiology in 1 week for wound check.     Orvel Meigs, MD  PGY-2 Resident  Internal Medicine  [facility]  3/11/2020  9:50 AM

## 2020-03-11 NOTE — PROGRESS NOTES
Patient has been discharged home. Patient and daughter were present during discharge instructions.  Both indicated understanding of the instructions

## 2020-03-11 NOTE — PROGRESS NOTES
CLINICAL PHARMACY NOTE: MEDS TO [address] Select Patient?: No  Total # of Prescriptions Filled: 2   The following medications were delivered to the patient:  · Carvedilol  · Tramadol  Total # of Interventions Completed: 0  Time Spent (min): 0    Additional Documentation: Medication delivered to patient at 1:15pm. Patient did not need Lisinopril because he has some at home so it was returned to stock

## 2020-03-11 NOTE — FLOWSHEET NOTE
DATE: 3/11/2020    NAME: Reggie Thakkar  MRN: [id number]   : 1960    Patient not seen this date for Physical Therapy due to:  [] Blood transfusion in progress  [] Hemodialysis  []  Patient Declined  [] Spine Precautions   [] Strict Bedrest  [] Surgery/ Procedure  [] Testing      [x] Other. Per RN pt independent, possible discharge this date. [] PT being discontinued at this time. Patient independent. No further needs. [] PT being discontinued at this time as the patient has been transferred to palliative care. No further needs. Vinod MCCARTHYA  Treatment performed by Student PTA under the supervision of co-signing PTA who agrees with all treatment and documentation.    Diego Lemus PTA

## 2020-03-11 NOTE — DISCHARGE SUMMARY
supply: 3 days. Take lowest dose possible to manage pain  Qty: 9 tablet, Refills: 0    Comments: Reduce doses taken as pain becomes manageable  Associated Diagnoses: Cardiac arrest (HCC)      carvedilol (COREG) 25 MG tablet Take 1 tablet by mouth 2 times daily (with meals)  Qty: 60 tablet, Refills: 3         CONTINUE these medications which have CHANGED    Details   lisinopril (PRINIVIL;ZESTRIL) 10 MG tablet Take 1 tablet by mouth daily  Qty: 30 tablet, Refills: 3         CONTINUE these medications which have NOT CHANGED    Details   terazosin (HYTRIN) 5 MG capsule Take 5 mg by mouth 2 times daily              Activity: activity as tolerated    Diet: cardiac diet    Follow-up:    Chastity Treadwell MD  [address]    Schedule an appointment as soon as possible for a visit in 1 week  hospital follow up    [facility]  [address] [place] [address]  [place] [address] [place] [address]  On 3/17/2020  11:30 am wound check s/p AICD      Follow up labs: none    Follow up imaging: none    Note that over 30 minutes was spent in preparing discharge papers, discussing discharge with patient, medication review, etc.      Juanpablo Rubio MD  PGY-2 Resident  Internal Medicine  [facility]  3/11/2020  12:45 PM  Attending Physician Statement  I have reviewed and edited the discharge summary of  Alfonso Sandoval AS NEEDED  ,   including pertinent history and exam findings. I have reviewed the key elements of all parts of the discharge summary . I agree with the information and plans as documented by the resident. Time spent on discharge planning ;          [] less than 30 minutes . [x]   more  than 30 minutes . Electronically signed by Caren Dinh MD on 3/11/2020 .

## 2020-03-12 LAB
CULTURE: NORMAL
Lab: NORMAL
SPECIMEN DESCRIPTION: NORMAL

## 2020-03-13 LAB
CULTURE: NORMAL
Lab: NORMAL
SPECIMEN DESCRIPTION: NORMAL

## 2020-04-01 ENCOUNTER — APPOINTMENT (OUTPATIENT)
Dept: GENERAL RADIOLOGY | Age: 60
End: 2020-04-01
Payer: COMMERCIAL

## 2020-04-01 ENCOUNTER — APPOINTMENT (OUTPATIENT)
Dept: CT IMAGING | Age: 60
End: 2020-04-01
Payer: COMMERCIAL

## 2020-04-01 ENCOUNTER — HOSPITAL ENCOUNTER (EMERGENCY)
Age: 60
Discharge: HOME | End: 2020-04-01
Attending: EMERGENCY MEDICINE
Payer: COMMERCIAL

## 2020-04-01 VITALS
HEART RATE: 67 BPM | BODY MASS INDEX: 41.31 KG/M2 | TEMPERATURE: 98.7 F | HEIGHT: 72 IN | WEIGHT: 305 LBS | OXYGEN SATURATION: 96 % | RESPIRATION RATE: 17 BRPM | SYSTOLIC BLOOD PRESSURE: 136 MMHG | DIASTOLIC BLOOD PRESSURE: 75 MMHG

## 2020-04-01 LAB
ABSOLUTE EOS #: 0.8 K/UL (ref 0–0.4)
ABSOLUTE IMMATURE GRANULOCYTE: ABNORMAL K/UL (ref 0–0.3)
ABSOLUTE LYMPH #: 1.9 K/UL (ref 1–4.8)
ABSOLUTE MONO #: 0.9 K/UL (ref 0–1)
ALBUMIN SERPL-MCNC: 4.5 G/DL (ref 3.5–5.2)
ALBUMIN/GLOBULIN RATIO: ABNORMAL (ref 1–2.5)
ALP BLD-CCNC: 103 U/L (ref 40–129)
ALT SERPL-CCNC: 27 U/L (ref 5–41)
ANION GAP SERPL CALCULATED.3IONS-SCNC: 13 MMOL/L (ref 9–17)
AST SERPL-CCNC: 16 U/L
BASOPHILS # BLD: 1 % (ref 0–2)
BASOPHILS ABSOLUTE: 0.1 K/UL (ref 0–0.2)
BILIRUB SERPL-MCNC: 0.32 MG/DL (ref 0.3–1.2)
BUN BLDV-MCNC: 21 MG/DL (ref 6–20)
BUN/CREAT BLD: 19 (ref 9–20)
CALCIUM SERPL-MCNC: 10.5 MG/DL (ref 8.6–10.4)
CHLORIDE BLD-SCNC: 97 MMOL/L (ref 98–107)
CO2: 30 MMOL/L (ref 20–31)
CREAT SERPL-MCNC: 1.09 MG/DL (ref 0.7–1.2)
D-DIMER QUANTITATIVE: 1.02 MG/L FEU (ref 0–0.5)
DIFFERENTIAL TYPE: YES
EOSINOPHILS RELATIVE PERCENT: 7 % (ref 0–5)
GFR AFRICAN AMERICAN: >60 ML/MIN
GFR NON-AFRICAN AMERICAN: >60 ML/MIN
GFR SERPL CREATININE-BSD FRML MDRD: ABNORMAL ML/MIN/{1.73_M2}
GFR SERPL CREATININE-BSD FRML MDRD: ABNORMAL ML/MIN/{1.73_M2}
GLUCOSE BLD-MCNC: 130 MG/DL (ref 70–99)
HCT VFR BLD CALC: 46.5 % (ref 41–53)
HEMOGLOBIN: 15.5 G/DL (ref 13.5–17.5)
IMMATURE GRANULOCYTES: ABNORMAL %
LYMPHOCYTES # BLD: 15 % (ref 13–44)
MCH RBC QN AUTO: 29.9 PG (ref 26–34)
MCHC RBC AUTO-ENTMCNC: 33.4 G/DL (ref 31–37)
MCV RBC AUTO: 89.6 FL (ref 80–100)
MONOCYTES # BLD: 7 % (ref 5–9)
NRBC AUTOMATED: ABNORMAL PER 100 WBC
PDW BLD-RTO: 13.8 % (ref 12.1–15.2)
PLATELET # BLD: 228 K/UL (ref 140–450)
PLATELET ESTIMATE: ABNORMAL
PMV BLD AUTO: ABNORMAL FL (ref 6–12)
POTASSIUM SERPL-SCNC: 4 MMOL/L (ref 3.7–5.3)
RBC # BLD: 5.19 M/UL (ref 4.5–5.9)
RBC # BLD: ABNORMAL 10*6/UL
SEG NEUTROPHILS: 70 % (ref 39–75)
SEGMENTED NEUTROPHILS ABSOLUTE COUNT: 8.6 K/UL (ref 2.1–6.5)
SODIUM BLD-SCNC: 140 MMOL/L (ref 135–144)
TOTAL PROTEIN: 7.9 G/DL (ref 6.4–8.3)
TROPONIN INTERP: NORMAL
TROPONIN T: <0.03 NG/ML
TROPONIN, HIGH SENSITIVITY: NORMAL NG/L (ref 0–22)
WBC # BLD: 12.2 K/UL (ref 3.5–11)
WBC # BLD: ABNORMAL 10*3/UL

## 2020-04-01 PROCEDURE — 6360000004 HC RX CONTRAST MEDICATION: Performed by: EMERGENCY MEDICINE

## 2020-04-01 PROCEDURE — 93005 ELECTROCARDIOGRAM TRACING: CPT | Performed by: EMERGENCY MEDICINE

## 2020-04-01 PROCEDURE — 80053 COMPREHEN METABOLIC PANEL: CPT

## 2020-04-01 PROCEDURE — 71275 CT ANGIOGRAPHY CHEST: CPT

## 2020-04-01 PROCEDURE — 84484 ASSAY OF TROPONIN QUANT: CPT

## 2020-04-01 PROCEDURE — 99285 EMERGENCY DEPT VISIT HI MDM: CPT

## 2020-04-01 PROCEDURE — 71045 X-RAY EXAM CHEST 1 VIEW: CPT

## 2020-04-01 PROCEDURE — 85025 COMPLETE CBC W/AUTO DIFF WBC: CPT

## 2020-04-01 PROCEDURE — 85379 FIBRIN DEGRADATION QUANT: CPT

## 2020-04-01 PROCEDURE — 6370000000 HC RX 637 (ALT 250 FOR IP): Performed by: EMERGENCY MEDICINE

## 2020-04-01 RX ORDER — FENTANYL CITRATE 50 UG/ML
100 INJECTION, SOLUTION INTRAMUSCULAR; INTRAVENOUS ONCE
Status: DISCONTINUED | OUTPATIENT
Start: 2020-04-01 | End: 2020-04-02 | Stop reason: HOSPADM

## 2020-04-01 RX ORDER — PREDNISONE 20 MG/1
60 TABLET ORAL ONCE
Status: COMPLETED | OUTPATIENT
Start: 2020-04-01 | End: 2020-04-01

## 2020-04-01 RX ORDER — ACETAMINOPHEN 325 MG/1
650 TABLET ORAL ONCE
Status: COMPLETED | OUTPATIENT
Start: 2020-04-01 | End: 2020-04-01

## 2020-04-01 RX ORDER — PREDNISONE 20 MG/1
40 TABLET ORAL DAILY
Qty: 10 TABLET | Refills: 0 | Status: SHIPPED | OUTPATIENT
Start: 2020-04-01 | End: 2020-04-06

## 2020-04-01 RX ADMIN — PREDNISONE 60 MG: 20 TABLET ORAL at 23:29

## 2020-04-01 RX ADMIN — ACETAMINOPHEN 650 MG: 325 TABLET, FILM COATED ORAL at 23:03

## 2020-04-01 RX ADMIN — IOPAMIDOL 100 ML: 755 INJECTION, SOLUTION INTRAVENOUS at 22:40

## 2020-04-01 ASSESSMENT — PAIN DESCRIPTION - ORIENTATION: ORIENTATION: RIGHT

## 2020-04-01 ASSESSMENT — PAIN DESCRIPTION - PAIN TYPE: TYPE: ACUTE PAIN

## 2020-04-01 ASSESSMENT — PAIN SCALES - GENERAL
PAINLEVEL_OUTOF10: 8
PAINLEVEL_OUTOF10: 8

## 2020-04-01 ASSESSMENT — PAIN DESCRIPTION - LOCATION: LOCATION: CHEST

## 2020-04-02 ENCOUNTER — CARE COORDINATION (OUTPATIENT)
Dept: CARE COORDINATION | Age: 60
End: 2020-04-02

## 2020-04-02 LAB
EKG ATRIAL RATE: 70 BPM
EKG P AXIS: 66 DEGREES
EKG P-R INTERVAL: 164 MS
EKG Q-T INTERVAL: 392 MS
EKG QRS DURATION: 116 MS
EKG QTC CALCULATION (BAZETT): 423 MS
EKG R AXIS: 5 DEGREES
EKG T AXIS: 25 DEGREES
EKG VENTRICULAR RATE: 70 BPM

## 2020-04-02 PROCEDURE — 93010 ELECTROCARDIOGRAM REPORT: CPT | Performed by: INTERNAL MEDICINE

## 2020-04-02 NOTE — CARE COORDINATION
Goble Osler was seen at [facility] ER 2020- CP, Pleurisy. Goble Osler stated he started Prednisone and is doing better. He denied fever, cough, congestion. He is scheduled with Dr. Albert Peterson 4/3/2020 for f/u. Patient contacted regarding recent discharge and COVID-19 risk   Care Transition Nurse/ Ambulatory Care Manager contacted the patient by telephone to perform post discharge assessment. Verified name and  with patient as identifiers. Patient has following risk factors of: heart failure. CTN/ACM reviewed discharge instructions, medical action plan and red flags related to discharge diagnosis. Reviewed and educated them on any new and changed medications related to discharge diagnosis. Advised obtaining a 90-day supply of all daily and as-needed medications. Education provided regarding infection prevention, and signs and symptoms of COVID-19 and when to seek medical attention with patient who verbalized understanding. Discussed exposure protocols and quarantine from [address] you at higher risk for severe illness  and given an opportunity for questions and concerns. The patient agrees to contact the COVID-19 hotline [phone] or PCP office for questions related to their healthcare. CTN/ACM provided contact information for future reference. From CDC: Are you at higher risk for severe illness?  Wash your hands often.  Avoid close contact (6 feet, which is about two arm lengths) with people who are sick.  Put distance between yourself and other people if COVID-19 is spreading in your community.  Clean and disinfect frequently touched surfaces.  Avoid all cruise travel and non-essential air travel.  Call your healthcare professional if you have concerns about COVID-19 and your underlying condition or if you are sick. For more information on steps you can take to protect yourself, see CDC's How to [address] voiced understanding.  He declined numbers for Hotline and Health Department.

## 2020-04-02 NOTE — ED NOTES
Patient's daughter [facility] notified as per patient request ([phone])     Jaison Henderson RN  04/01/20 2561

## 2020-04-02 NOTE — ED PROVIDER NOTES
Socioeconomic History    Marital status:      Spouse name: None    Number of children: None    Years of education: None    Highest education level: None   Occupational History    None   Social Needs    Financial resource strain: None    Food insecurity     Worry: None     Inability: None    Transportation needs     Medical: None     Non-medical: None   Tobacco Use    Smoking status: Current Every Day Smoker     Packs/day: 1.00     Years: 40.00     Pack years: 40.00     Types: Cigarettes    Smokeless tobacco: Never Used   Substance and Sexual Activity    Alcohol use: Not Currently     Comment: Last drink 1980, hx alcoholism per pt    Drug use: Never    Sexual activity: None   Lifestyle    Physical activity     Days per week: None     Minutes per session: None    Stress: None   Relationships    Social connections     Talks on phone: None     Gets together: None     Attends Mandaeism service: None     Active member of club or organization: None     Attends meetings of clubs or organizations: None     Relationship status: None    Intimate partner violence     Fear of current or ex partner: None     Emotionally abused: None     Physically abused: None     Forced sexual activity: None   Other Topics Concern    None   Social History Narrative    None           Review of Systems:  Constitutional:  Denies fever, chills, weight loss or weakness   Eyes:  Denies photophobia or discharge   HENT:  Denies sore throat or ear pain   Respiratory:  Denies cough or shortness of breath   Cardiovascular: Stifel chest pain. GI:  Denies abdominal pain, nausea, vomiting, or diarrhea   Musculoskeletal:  Denies back pain   Skin:  Denies rash   Neurologic:  Denies headache, focal weakness or sensory changes   Endocrine:  Denies polyuria or polydypsia   Lymphatic:  Denies swollen glands   Psychiatric:  Denies depression, suicidal ideation or homicidal ideation   All systems negative except as marked.      PHYSICAL

## 2020-04-14 ENCOUNTER — CARE COORDINATION (OUTPATIENT)
Dept: CARE COORDINATION | Age: 60
End: 2020-04-14

## 2020-04-14 NOTE — CARE COORDINATION
4/14/2020- 12:55 pm spoke with [place]. He denied any concerns. He did see PCP after ER Visit. Your Patient resolved from the Care Transitions episode on 4/14/2020  Patient/family has been provided the following resources and education related to COVID-19:                         Signs, symptoms and red flags related to COVID-19            CDC exposure and quarantine guidelines            [facility] exposure contact - [phone]            Contact for their local Department of Health                 Patient currently reports that the following symptoms have improved:  no new/worsening symptoms     No further outreach scheduled with this CTN/ACM. Episode of Care resolved. Patient has this CTN/ACM contact information if future needs arise.

## 2021-06-03 ENCOUNTER — HOSPITAL ENCOUNTER (OUTPATIENT)
Dept: DIABETES SERVICES | Age: 61
Setting detail: THERAPIES SERIES
Discharge: HOME | End: 2021-06-03
Payer: COMMERCIAL

## 2021-06-03 VITALS — HEIGHT: 72 IN | WEIGHT: 315 LBS | BODY MASS INDEX: 42.66 KG/M2

## 2021-06-03 PROCEDURE — G0108 DIAB MANAGE TRN  PER INDIV: HCPCS

## 2021-06-03 SDOH — ECONOMIC STABILITY: FOOD INSECURITY: ADDITIONAL INFORMATION: NO

## 2021-06-03 ASSESSMENT — SLEEP AND FATIGUE QUESTIONNAIRES
HAVE YOU BEEN TOLD, OR NOTICED ON YOUR OWN, THAT YOU STOP BREATHING OR STRUGGLE TO BREATHE IN YOUR SLEEP: YES
HAVE YOU EVER BEEN TESTED FOR SLEEP APNEA: YES
HOW MANY HOURS OF SLEEP ARE YOU GETTING, ON AVERAGE: 7 OR MORE
HOW DO YOU RATE THE QUALITY OF YOUR SLEEP: GOOD

## 2021-06-03 ASSESSMENT — PROBLEM AREAS IN DIABETES QUESTIONNAIRE (PAID)
WORRYING ABOUT THE FUTURE AND THE POSSIBILITY OF SERIOUS COMPLICATIONS: 4
COPING WITH COMPLICATIONS OF DIABETES: 1
FEELING DEPRESSED WHEN YOU THINK ABOUT LIVING WITH DIABETES: 0
FEELING SCARED WHEN YOU THINK ABOUT LIVING WITH DIABETES: 4
FEELING THAT DIABETES IS TAKING UP TOO MUCH OF YOUR MENTAL AND PHYSICAL ENERGY EVERY DAY: 0
PAID-5 TOTAL SCORE: 9

## 2021-06-03 NOTE — PROGRESS NOTES
Diabetes Self-Management Education Record    Progress Note:  Violet Disla here for diabetes education initial visit and assessment. Violet Disla was recently diagnosed with type 2 DM with an A1C of 6.8%. He wants to try to manage his diabetes without medication. He currently is not monitoring his blood sugars and states he does not have a glucometer. Instructed on how to obtain a monitor. Instructed on key times to check his glucose, target numbers, keeping a log, and importance of taking his log to his appointments. Discussed hyper/hypoglycemia symptoms and what to do when blood sugars are too high or too low. States he has had lows with symptoms of shakiness, sweating, and light-headedness in the past. Blood sugar was 192 when checked in this office at 10:00. He had a pack of crackers and cheese at 7:00 AM. Discussed healthy eating and the importance of having 3 meals a day at consistent times. Discussed basic carb counting. Instructed on creating a menu using his usual food choices, stressing portion control. Encouraged to add fruits and vegetables. Briefly discussed the benefits of exercise in managing diabetes. He stays active by working and with his hobbies. He also walks to [employer] from his home on most days. States it usually takes him approx 20 minutes to walk to the store, shop, then walk back home. Will further discuss exercise at next visit on 6-10-21 at 4:00 PM. Will follow and support as needed. Encouraged to call with questions or concerns.      Participant Name: Jewell Rush   Referring Provider:  Simmie Severin, MD    Keys to learning:  Considerations: []Language []Emotional []Health Literacy  []Cognitive []Memory changes []Financial []Cultural   []Confucianism []Vision []Hearing  []Speech []Lack of desire  []Literacy  []Psycho-social  []None [x] Covid-19 group restrictions  If considerations are noted, accommodations made: Individual    Identified barriers to learning/self management: None    The following information was discussed:    [x] Diabetes disease process and treatment options   [x] Healthy nutrition, carbohydrate counting, meal planning  [x] Monitoring blood glucose and other parameters; interpreting and using results  [x] Acute complications--prevention, detection and treatment  [] Medication management and safety   [x] Incorporating physical activity into lifestyle   [] Exercise for Health, Reducing Risks for Heart Disease, Diabetes and Heart Health  [] Preventing, through risk reduction behaviors, detecting, and treating chronic complications  [] Sick Day management  [x] Developing personalized strategies to address psychosocial issues and concerns  [x] Developing personalized strategies to promote health and behavior change through goalsetting, behavior change strategies aimed at risk reduction  [] Special situations--disaster planning, travel, social activities  [] Foot, skin, and dental care    Session Assessment & Evaluation Ratings:  1=Needs Instruction  2=Needs Review  3=Comprehends Key Points  4=Demonstrates Understanding/Competency  NC=Not Covered   N/A=Not Applicable Initial  Assess    Date:  6/3/21 2nd  Visit     Date:   3rd  Visit    Date: 4th  Visit    Date: Comments  S.O.C=Stage of Change/Readiness to change:  · Pre=Pre-contemplation stage--not thinking about changing  · C=Contemplation stage--ambivalent about changing  · P=Preparation stage--prepared to made a specific change  · A=Action stage--committed to modify behaviors  · M=Maintenance and relapse prevention--incorporating new behavior   Participant Stated  Goal                            Participant Stated Goal       S. O.C  [] PRE  [] C  [x] P      [] A  [] M                    S.O.C  [] PRE  [] C  [] P      [] A  [] M     S.O.C  [] PRE  [] C  [] P      [] A  [] M                     S.O.C  [] PRE  [] C  [] P      [] A  [] M      S.O.C  [] PRE  [] C  [] P      [] A  [] M                    S.O.C  [] PRE  [] C  [] P      [] A  [] M     S.O.C  [] PRE  [] C  [] P      [] A  [] M                    S.O.C  [] PRE  [] C  [] P      [] A  [] M   Current main goal attainment frequency:   [] Never  [x] Some  [] Half  [] Most  [] All    Participant confidence to master goal this visit:   [x] Excellent  [x] Good  [] Evalee Poag  [] Poor            Current main goal attainment frequency:   [] Never  [] Some  [] Half  [] Most  [] All    Participant confidence to master goal this visit:   [] Excellent  [] Good [] Fair  [] Poor                  Session  Topics & Learning Objectives:      Comments:   Diabetes disease process & Treatment process: Define diabetes & prediabetes; identify own type of diabetes; role of the pancreas; signs/symptoms; diagnostic criteria; prevention and treatment options; contributing factors. Rating  [x] 1  [] 2  [] 3  [] 4      [] NC  [] N/A   Rating  [] 1  [] 2  [] 3  [] 4  [] NC  [] N/A     Rating  [] 1  [] 2  [] 3  [] 4  [] NC  [] N/A     Rating  [] 1  [] 2  [] 3  [] 4  [] NC  [] N/A           Incorporating nutritional management into lifestyle: Describe effect of type, amount & timing of food on blood glucose; Describe basic meal planning techniques & current nutrition guidelines; Correctly read food labels & demonstrate CHO counting & portion control with personalized meal plan. Rating  [x] 1  [] 2  [] 3  [] 4  [] NC  [] N/A     Rating  [] 1  [] 2  [] 3  [] 4  [] NC  [] N/A     Rating  [] 1  [] 2  [] 3  [] 4  [] NC  [] N/A     Rating  [] 1  [] 2  [] 3  [] 4  [] NC  [] N/A                 Incorporating physical activity into lifestyle:   State effect of exercise on blood glucose levels. Identifies personal exercise plan and contraindications. Discussed safety tips while exercising.             Rating  [x] 1  [] 2  [] 3  [] 4  [] NC  [] N/A     Rating  [] 1  [] 2  [] 3  [] 4  [] NC  [] N/A       Rating  [] 1  [] 2  [] 3  [] 4  [] NC  [] N/A     Rating  [] 1  [] 2  [] 3  [] 4  [] NC  [] N/A           Using medications safely: State effect of diabetes medicines on diabetes;   Name diabetes medication taking, action, timing & side effects. Rating  [] 1  [] 2  [] 3  [] 4  [] NC  [] N/A       Rating  [] 1  [] 2  [] 3  [] 4  [] NC  [] N/A         Rating  [] 1  [] 2  [] 3  [] 4  [] NC  [] N/A   Rating  [] 1  [] 2  [] 3  [] 4  [] NC  [] N/A      ________             Insulin/injectable-  Appropriate injection site; proper storage; proper technique; safe needle disposal.   Rating  [] 1  [] 2  [] 3  [] 4  [] NC  [] N/A Rating  [] 1  [] 2  [] 3  [] 4  [] NC  [] N/A Rating  [] 1  [] 2  [] 3  [] 4  [] NC  [] N/A Rating  [] 1  [] 2  [] 3  [] 4  [] NC  [] N/A        Monitoring blood glucose, interpreting and using results: Identify recommended blood glucose targets, personal targets, A1C target, importance of logging glucose,appropriate techniques and problem solving. Safe lancet disposal.    Rating  [x] 1  [] 2  [] 3  [] 4  [] NC  [] N/A     Rating  [] 1  [] 2  [] 3  [] 4  [] NC  [] N/A       Rating  [] 1  [] 2  [] 3  [] 4  [] NC  [] N/A     Rating  [] 1  [] 2  [] 3  [] 4  [] NC  [] N/A      Not currently monitoring. Instructed on monitoring his blood sugar at key times and keeping a log. Blood sugar 192 at 10:00. He last ate a pack of crackers and cheese at 7:00 AM.    Prevention, detection & treatment of acute complications: List symptoms of hyper- and hypoglycemia; Describe how to treat low blood sugar & actions for lowering high blood glucose levels. Prevention and treatment strategies. Rating  [x] 1  [] 2  [] 3  [] 4  [] NC  [] N/A   Rating  [] 1  [] 2  [] 3  [] 4  [] NC  [] N/A   Rating  [] 1  [] 2  [] 3  [] 4  [] NC  [] N/A Rating  [] 1  [] 2  [] 3  [] 4  [] NC  [] N/A                   Describe sick day guidelines and indications for physician notification.    Rating  [] 1  [] 2  [] 3  [] 4  [] NC  [] N/A     Rating  [] 1  [] 2  [] 3  [] 4  [] NC  [] N/A     Rating  [] 1  [] 2  [] 3  [] 4  [] NC  [] N/A     Rating  [] 1  [] 2  [] 3  [] 4  [] NC  [] N/A        Prevention, detection & treatment of chronic complications: Define the natural course of diabetes & describe the relationship of blood glucose levels to long term complications of diabetes. Identify preventative measures and standard of care. Rating  [] 1  [] 2  [] 3  [] 4  [] NC  [] N/A     Rating  [] 1  [] 2  [] 3  [] 4  [] NC  [] N/A     Rating  [] 1  [] 2  [] 3  [] 4  [] NC  [] N/A     Rating  [] 1  [] 2  [] 3  [] 4  [] NC  [] N/A      Developing strategies to address psychosocial issues: Describe feelings about living with diabetes; Identify support needed & support network. Describe how stress, depression, and anxiety affect blood glucose. Identify coping strategies. Rating  [x] 1  [] 2  [] 3  [] 4  [] NC  [] N/A       Rating  [] 1  [] 2  [] 3  [] 4  [] NC  [] N/A     Rating  [] 1  [] 2  [] 3  [] 4  [] NC  [] N/A     Rating  [] 1  [] 2  [] 3  [] 4  [] NC  [] N/A          Developing strategies to promote health/change behavior:  Define the ABCs of diabetes; Identify appropriate screenings, schedule & personal plan for screenings. Identify 7 self-care behaviors. Benefits, challenges and strategies for behavioral change. Rating  [x] 1  [] 2  [] 3  [] 4  [] NC  [] N/A     Rating  [] 1  [] 2  [] 3  [] 4  [] NC  [] N/A     Rating  [] 1  [] 2  [] 3  [] 4  [] NC  [] N/A     Rating  [] 1  [] 2  [] 3  [] 4  [] NC  [] N/A             Time spent with patient: 76 Minutes    Next Appointment: 6/10/21 at 4:00 PM     Instruction Method: [x]Lecture/Discussion  []Power Point Presentation  [x]Handouts  []Return Demonstration     Education Materials/Equipment Provided:      [x]Self-Management - Initial assessment - ADA  Where do I Begin? Living with Type 2 diabetes\" booklet;  Diet meal planning basics, handout on diabetes education classes, hyper/hypoglycemia signs/symptoms and treatment handout; Diabetes zones;  Support plan resource list.      []Self-Management  Class 1 - Diabetes: Your Take Control Guide\" booklet. Healthy Interactions [facility] \"On The Road To Better Managing Your Diabetes\". [] Self-Management  Class 2 -  \"Traveling with Diabetes\", Dining Out With Diabetes, \"Coping with Diabetes\", \"Diabetes Disaster Planning\", \"Know Your Numbers/Diabetes Care Checklist\", [address] [place] [address] Blood Sugar, Low Blood Sugar. Healthy Interactions Map \"Monitoring Your Blood Glucose. \"     [] Self-Management  Class 3 - \"Risk Factors for CVD\", foot care tips sheet and \"How to pick the right shoe\", \"Medications Used To Treat Diabetes\", How To Care For Your Teeth and Gums\", \"Vaccinations For Adults with Diabetes\", \"Type 2 diabetes and the role of GLP-1\". Healthy Interactions Map \"Continuing Your Journey\". []Self-Management - 3 month follow-up      []Glucose Meter      []Insulin Kit      []Other        Handouts/Booklets given:     [] Diabetes-Your Take Control Guide   [x] Daily Diabetic Meal Planning Guide   [] Nutrition in the Fast Chip    [x] Resources for People With Diabetes   [] Other       Diabetes Self Management Support Plan: To assist and support your continued progress in managing your diabetes following education-    (  )  Gym or exercise program of your choice. (Suggestions:  [employer], Circuit training, any exercise facility and your local Physical Therapy or Cardiac Rehabilitation exercise Program)      (  )   Library    ( x )    ADA website:  BrowserReHealth News.ca. org    (  )   Http: DotProtection.gl    (  )   Diabetes Forecast [place] you may get this information on the ADA web site.     (  )  Diabetes Interview - [place]   [phone]    (  )  Diabetes Self Management (bi-monthly magazine)  [phone]    (  ) Support group: (  ) Support group: Dia first Tuesday of the month at 9 am and Kair Russell third Wednesday of the month at 9 am    (  )  Health Journeys [employer]  (relaxation tapes for people with Diabetes)  [phone]    (  ) Your suggestions:                       Diabetes Screening    Patient Name: Tito Pelletier  YOB: 1960  Today's Date: 6/3/2021    Diabetes History   Type of Diabetes: Type 2  Family History of Diabetes: Yes (Mother)  Is this a new diagnosis for you: Yes  Year diagnosed with diabetes: 2021  How do you rate your understanding of diabetes : Poor  What are your feelings about your health and diabetes?: Want to improve blood sugar control    Risk Reduction/Problem Solving  Which of the following have you completed in the past 12 months?: A1C, Blood pressure check, Cholesterol check, Dental exam, Dilated eye exam, Foot exam by health professional, Flu shot, Weight check  Frequency of foot self-exam?: Daily  Are you premenopausal or menopausal?: N/A  Are you aware of the impact of Diabetes on pregnancy?: No  Is it important to manage Diabetes?: important  Are you ready to manage your Diabetes?: ready  What is your current diabetes support plan?: Access a diabetes website    Monitoring  Do you check your blood sugar?: No           Have you experienced low blood sugar symptoms?: Yes, Confusion, Shaky, Sweating  How do you treat low blood sugar symptoms?: Juice (Discussed)    Diet History  Do you follow a meal plan for diabetes?: No  Do you skip meals?: Yes  Weight history in the last three months?: Gain (40 lb \"quit smoking\" recently)       Resources, Support and Activity              Support system(s): Children    Lab/Test Results  Lab/Test Results  Weight: (!) 354 lb (160.6 kg)  Hemoglobin a1c result manual entry: 6.8  Blood glucose manual entry: 192 (Taken at 10:00. Last ate at 7:00 AM)    PHQ-2  PHQ-2 Over the past 2 weeks, how often have you been bothered by any of the following problems?   Depression Unable to Assess: Pt refuses    PHQ-9       PHQ-9 Total Score       PAID-5 Total Score: [address]  Diabetes clinic educator  6/3/2021  11:40 AM

## 2021-06-10 ENCOUNTER — HOSPITAL ENCOUNTER (OUTPATIENT)
Dept: DIABETES SERVICES | Age: 61
Setting detail: THERAPIES SERIES
Discharge: HOME | End: 2021-06-10
Payer: COMMERCIAL

## 2021-06-10 PROCEDURE — G0108 DIAB MANAGE TRN  PER INDIV: HCPCS

## 2021-06-10 NOTE — PROGRESS NOTES
Diabetes Self-Management Education Record   Conversation map \"On The Road to Better Managing Your Diabetes\" utilized. Topics:  1 What diabetes is and some of the most common myths about diabetes  2 The feelings that you can have about diabetes  3 What blood glucose and insulin are  4 Monitoring your blood glucose and using the results  5 Managing diabetes with healthy eating, physical activity, and taking medicine  6 The importance of having a plan and engaging a support network and health care team    Progress Note:  Supriya Feldman here for diabetes education class one. Supriya Feldman was recently diagnosed with type 2 DM with an A1C of 6.8%. He is trying to manage his diabetes without medication. He is exercising more and watching his food portions. Since last visit he has lost 7.4 lbs. He is monitoring his blood sugars 3 times a day and keeping a log. Fasting readings have been 138 - 182; before supper 113 - 128; and 2 hours after supper 118 - 156. Reviewed hyper/hypoglycemia symptoms and treatment. Blood sugar was 116 when checked in this office at 33 64 74. He last ate at noon and had a packaged meal with 18 grams of carbohydrates. He has been trying to get about 60 grams per day. Further discussed healthy eating and the importance of having 3 meals a day at consistent times with 60 - 75 grams per meal, instead of per day. Discussed the importance of exercise in managing his diabetes. He stays active by working, has hobbies, and by walking almost everyday. He walks to [employer] from his home which usually takes him approx 20 minutes to walk to the store, shop, then walk back home. He is willing to increase his walk time to at least 30 minutes on most days. All above topics discussed for class one. Class 2 scheduled for 6-16-21 at 4:00 PM. Will continue follow and support as needed. Encouraged to call with questions or concerns.      Participant Name: Concetta Garcia   Referring Provider:  Mary Jo Malhotra MD    [place] to learning:  Considerations: []Language []Emotional []Health Literacy  []Cognitive []Memory changes []Financial []Cultural   []Yazidism []Vision []Hearing  []Speech []Lack of desire  []Literacy  []Psycho-social  []None [x] Covid-19 group restrictions  If considerations are noted, accommodations made: Individual    Identified barriers to learning/self management: None    The following information was discussed:    [x] Diabetes disease process and treatment options   [x] Healthy nutrition, carbohydrate counting, meal planning  [x] Monitoring blood glucose and other parameters; interpreting and using results  [x] Acute complications--prevention, detection and treatment  [] Medication management and safety   [x] Incorporating physical activity into lifestyle   [x] Exercise for Health, Reducing Risks for Heart Disease, Diabetes and Heart Health  [] Preventing, through risk reduction behaviors, detecting, and treating chronic complications  [] Sick Day management  [x] Developing personalized strategies to address psychosocial issues and concerns  [x] Developing personalized strategies to promote health and behavior change through goalsetting, behavior change strategies aimed at risk reduction  [] Special situations--disaster planning, travel, social activities  [] Foot, skin, and dental care    Session Assessment & Evaluation Ratings:  1=Needs Instruction  2=Needs Review  3=Comprehends Key Points  4=Demonstrates Understanding/Competency  NC=Not Covered   N/A=Not Applicable Initial  Assess    Date:  6/3/21 2nd  Visit     Date:  6/10/21 3rd  Visit    Date: 4th  Visit    Date: Comments  S.O.C=Stage of Change/Readiness to change:  · Pre=Pre-contemplation stage--not thinking about changing  · C=Contemplation stage--ambivalent about changing  · P=Preparation stage--prepared to made a specific change  · A=Action stage--committed to modify behaviors  · M=Maintenance and relapse prevention--incorporating new behavior   Participant Stated  Goal                            Participant Stated Goal       S. O.C  [] PRE  [] C  [x] P      [] A  [] M                    S.O.C  [] PRE  [] C  [] P      [] A  [] M     S.O.C  [] PRE  [] C  [] P      [] A  [x] M                     S.O.C  [] PRE  [] C  [] P      [] A  [] M      S.O.C  [] PRE  [] C  [] P      [] A  [] M                    S.O.C  [] PRE  [] C  [] P      [] A  [] M     S.O.C  [] PRE  [] C  [] P      [] A  [] M                    S.O.C  [] PRE  [] C  [] P      [] A  [] M   Current main goal attainment frequency:   [] Never  [x] Some  [] Half  [] Most  [] All    Participant confidence to master goal this visit:   [x] Excellent  [x] Good  [] [address]  [] Poor            Current main goal attainment frequency:   [] Never  [] Some  [] Half  [] Most  [] All    Participant confidence to master goal this visit:   [] Excellent  [] Good [] Fair  [] Poor                  Session  Topics & Learning Objectives:      Comments:   Diabetes disease process & Treatment process: Define diabetes & prediabetes; identify own type of diabetes; role of the pancreas; signs/symptoms; diagnostic criteria; prevention and treatment options; contributing factors. Rating  [x] 1  [] 2  [] 3  [] 4      [] NC  [] N/A   Rating  [] 1  [] 2  [x] 3  [] 4  [] NC  [] N/A     Rating  [] 1  [] 2  [] 3  [] 4  [] NC  [] N/A     Rating  [] 1  [] 2  [] 3  [] 4  [] NC  [] N/A           Incorporating nutritional management into lifestyle: Describe effect of type, amount & timing of food on blood glucose; Describe basic meal planning techniques & current nutrition guidelines; Correctly read food labels & demonstrate CHO counting & portion control with personalized meal plan.   Rating  [x] 1  [] 2  [] 3  [] 4  [] NC  [] N/A     Rating  [] 1  [] 2  [x] 3  [] 4  [] NC  [] N/A     Rating  [] 1  [] 2  [] 3  [] 4  [] NC  [] N/A     Rating  [] 1  [] 2  [] 3  [] 4  [] NC  [] N/A                 Incorporating physical activity into lifestyle:   State effect of exercise on blood glucose levels. Identifies personal exercise plan and contraindications. Discussed safety tips while exercising. Rating  [x] 1  [] 2  [] 3  [] 4  [] NC  [] N/A     Rating  [] 1  [] 2  [x] 3  [] 4  [] NC  [] N/A       Rating  [] 1  [] 2  [] 3  [] 4  [] NC  [] N/A     Rating  [] 1  [] 2  [] 3  [] 4  [] NC  [] N/A           Using medications safely: State effect of diabetes medicines on diabetes;   Name diabetes medication taking, action, timing & side effects. Rating  [] 1  [] 2  [] 3  [] 4  [] NC  [] N/A       Rating  [] 1  [] 2  [] 3  [] 4  [] NC  [] N/A         Rating  [] 1  [] 2  [] 3  [] 4  [] NC  [] N/A   Rating  [] 1  [] 2  [] 3  [] 4  [] NC  [] N/A      ________             Insulin/injectable-  Appropriate injection site; proper storage; proper technique; safe needle disposal.   Rating  [] 1  [] 2  [] 3  [] 4  [] NC  [] N/A Rating  [] 1  [] 2  [] 3  [] 4  [] NC  [] N/A Rating  [] 1  [] 2  [] 3  [] 4  [] NC  [] N/A Rating  [] 1  [] 2  [] 3  [] 4  [] NC  [] N/A        Monitoring blood glucose, interpreting and using results: Identify recommended blood glucose targets, personal targets, A1C target, importance of logging glucose,appropriate techniques and problem solving. Safe lancet disposal.    Rating  [x] 1  [] 2  [] 3  [] 4  [] NC  [] N/A     Rating  [] 1  [] 2  [x] 3  [] 4  [] NC  [] N/A       Rating  [] 1  [] 2  [] 3  [] 4  [] NC  [] N/A     Rating  [] 1  [] 2  [] 3  [] 4  [] NC  [] N/A      Not currently monitoring. Instructed on monitoring his blood sugar at key times and keeping a log. Blood sugar 192 at 10:00. He last ate a pack of crackers and cheese at 7:00 AM.    Prevention, detection & treatment of acute complications: List symptoms of hyper- and hypoglycemia; Describe how to treat low blood sugar & actions for lowering high blood glucose levels. Prevention and treatment strategies.    Rating  [x] 1  [] 2  [] 3  [] 4  [] NC  [] N/A Rating  [] 1  [] 2  [x] 3  [] 4  [] NC  [] N/A   Rating  [] 1  [] 2  [] 3  [] 4  [] NC  [] N/A Rating  [] 1  [] 2  [] 3  [] 4  [] NC  [] N/A                   Describe sick day guidelines and indications for physician notification. Rating  [] 1  [] 2  [] 3  [] 4  [] NC  [] N/A     Rating  [] 1  [] 2  [] 3  [] 4  [] NC  [] N/A     Rating  [] 1  [] 2  [] 3  [] 4  [] NC  [] N/A     Rating  [] 1  [] 2  [] 3  [] 4  [] NC  [] N/A        Prevention, detection & treatment of chronic complications: Define the natural course of diabetes & describe the relationship of blood glucose levels to long term complications of diabetes. Identify preventative measures and standard of care. Rating  [] 1  [] 2  [] 3  [] 4  [] NC  [] N/A     Rating  [] 1  [] 2  [] 3  [] 4  [] NC  [] N/A     Rating  [] 1  [] 2  [] 3  [] 4  [] NC  [] N/A     Rating  [] 1  [] 2  [] 3  [] 4  [] NC  [] N/A      Developing strategies to address psychosocial issues: Describe feelings about living with diabetes; Identify support needed & support network. Describe how stress, depression, and anxiety affect blood glucose. Identify coping strategies. Rating  [x] 1  [] 2  [] 3  [] 4  [] NC  [] N/A       Rating  [] 1  [] 2  [x] 3  [] 4  [] NC  [] N/A     Rating  [] 1  [] 2  [] 3  [] 4  [] NC  [] N/A     Rating  [] 1  [] 2  [] 3  [] 4  [] NC  [] N/A          Developing strategies to promote health/change behavior:  Define the ABCs of diabetes; Identify appropriate screenings, schedule & personal plan for screenings. Identify 7 self-care behaviors. Benefits, challenges and strategies for behavioral change.       Rating  [x] 1  [] 2  [] 3  [] 4  [] NC  [] N/A     Rating  [] 1  [] 2  [] 3  [] 4  [] NC  [] N/A     Rating  [] 1  [] 2  [] 3  [] 4  [] NC  [] N/A     Rating  [] 1  [] 2  [] 3  [] 4  [] NC  [] N/A             Time spent with patient: 72 Minutes    Next Appointment: Class 2: 6/16/21 at 4:00 PM; Dietitian: 6-25-21 at 2:00 PM     Instruction Method: [x]Lecture/Discussion  []Power Point Presentation  [x]Handouts  []Return Demonstration     Education Materials/Equipment Provided:      [x]Self-Management - Initial assessment - ADA  Where do I Begin? Living with Type 2 diabetes\" booklet;  Diet meal planning basics, handout on diabetes education classes, hyper/hypoglycemia signs/symptoms and treatment handout; Diabetes zones; Support plan resource list.      [x]Self-Management  Class 1 - Diabetes: Your Take Control Guide\" booklet. Healthy Interactions [facility] \"On The Road To Better Managing Your Diabetes\". [] Self-Management  Class 2 -  \"Traveling with Diabetes\", Dining Out With Diabetes, \"Coping with Diabetes\", \"Diabetes Disaster Planning\", \"Know Your Numbers/Diabetes Care Checklist\", [address] [place] [address] Blood Sugar, Low Blood Sugar. Healthy Interactions Map \"Monitoring Your Blood Glucose. \"     [] Self-Management  Class 3 - \"Risk Factors for CVD\", foot care tips sheet and \"How to pick the right shoe\", \"Medications Used To Treat Diabetes\", How To Care For Your Teeth and Gums\", \"Vaccinations For Adults with Diabetes\", \"Type 2 diabetes and the role of GLP-1\". Healthy Interactions Map \"Continuing Your Journey\". []Self-Management - 3 month follow-up      []Glucose Meter      []Insulin Kit      []Other        Handouts/Booklets given:     [] Diabetes-Your Take Control Guide   [x] Daily Diabetic Meal Planning Guide   [] Nutrition in the [employer]    [x] Resources for People With Diabetes   [] Other       Diabetes Self Management Support Plan: To assist and support your continued progress in managing your diabetes following education-    (  )  Gym or exercise program of your choice. (Suggestions:  [employer], Circuit training, any exercise facility and your local Physical Therapy or Cardiac Rehabilitation exercise Program)      (  )   Library    ( x )    ADA website:  BrowserReview.ca. org    (  )   Http: DotProtection.gl    (  ) Diabetes Forecast [place] you may get this information on the ADA web site.     (  )  Diabetes Interview - [place]   [phone]    (  )  Diabetes Self Management (bi-monthly magazine)  [phone]    (  ) Support group: (  ) Support group: Dia first Tuesday of the month at 9 am and Tobi Echols third Wednesday of the month at 9 am    (  )  Health Journeys [employer] Paths  (relaxation tapes for people with Diabetes)  [phone]    (  )  Your suggestions:                           Lindsey Ornelas RN  [facility]  Diabetes clinic educator  6/10/2021  5:26 PM

## 2021-06-15 VITALS — WEIGHT: 315 LBS | BODY MASS INDEX: 42.66 KG/M2 | HEIGHT: 72 IN

## 2021-06-16 ENCOUNTER — HOSPITAL ENCOUNTER (OUTPATIENT)
Dept: DIABETES SERVICES | Age: 61
Setting detail: THERAPIES SERIES
Discharge: HOME | End: 2021-06-16
Payer: COMMERCIAL

## 2021-06-16 VITALS — WEIGHT: 315 LBS | HEIGHT: 72 IN | BODY MASS INDEX: 42.66 KG/M2

## 2021-06-16 PROCEDURE — G0108 DIAB MANAGE TRN  PER INDIV: HCPCS

## 2021-06-16 NOTE — PROGRESS NOTES
Helena Herrmann MD    [place] to learning:  Considerations: []Language []Emotional []Health Literacy  []Cognitive []Memory changes []Financial []Cultural   []Church []Vision []Hearing  []Speech []Lack of desire  []Literacy  []Psycho-social  []None [x] Covid-19 group restrictions  If considerations are noted, accommodations made: Individual    Identified barriers to learning/self management: None    The following information was discussed:    [x] Diabetes disease process and treatment options   [x] Healthy nutrition, carbohydrate counting, meal planning  [x] Monitoring blood glucose and other parameters; interpreting and using results  [x] Acute complications--prevention, detection and treatment  [] Medication management and safety   [x] Incorporating physical activity into lifestyle   [x] Exercise for Health, Reducing Risks for Heart Disease, Diabetes and Heart Health  [] Preventing, through risk reduction behaviors, detecting, and treating chronic complications  [x] Sick Day management  [x] Developing personalized strategies to address psychosocial issues and concerns  [x] Developing personalized strategies to promote health and behavior change through goalsetting, behavior change strategies aimed at risk reduction  [x] Special situations--disaster planning, travel, social activities  [x] Foot, skin, and dental care    Session Assessment & Evaluation Ratings:  1=Needs Instruction  2=Needs Review  3=Comprehends Key Points  4=Demonstrates Understanding/Competency  NC=Not Covered   N/A=Not Applicable Initial  Assess    Date:  6/3/21 2nd  Visit     Date:  6/10/21 3rd  Visit    Date:  6/16/21 4th  Visit    Date: Comments  S.O.C=Stage of Change/Readiness to change:  · Pre=Pre-contemplation stage--not thinking about changing  · C=Contemplation stage--ambivalent about changing  · P=Preparation stage--prepared to made a specific change  · A=Action stage--committed to modify behaviors  · M=Maintenance and relapse prevention--incorporating new behavior   Participant Stated  Goal      Healthy Eating - 3 meals a day with a bedtime snack, read labels, count CHO                      Participant Stated Goal       S. O.C  [] PRE  [] C  [x] P      [] A  [] M                    S.O.C  [] PRE  [] C  [] P      [] A  [] M     S.O.C  [] PRE  [] C  [] P      [] A  [x] M                     S.O.C  [] PRE  [] C  [] P      [] A  [] M      S.O.C  [] PRE  [] C  [] P      [] A  [x] M                    S.O.C  [] PRE  [] C  [] P      [] A  [] M     S.O.C  [] PRE  [] C  [] P      [] A  [] M                    S.O.C  [] PRE  [] C  [] P      [] A  [] M   Current main goal attainment frequency:   [] Never  [x] Some  [] Half  [x] Most 6/16/21  [] All    Participant confidence to master goal this visit:   [x] Excellent  [x] Good  [] [place]  [] Poor            Current main goal attainment frequency:   [] Never  [] Some  [] Half  [] Most  [] All    Participant confidence to master goal this visit:   [] Excellent  [] Good [] Fair  [] Poor                  Session  Topics & Learning Objectives:      Comments:   Diabetes disease process & Treatment process: Define diabetes & prediabetes; identify own type of diabetes; role of the pancreas; signs/symptoms; diagnostic criteria; prevention and treatment options; contributing factors. Rating  [x] 1  [] 2  [] 3  [] 4      [] NC  [] N/A   Rating  [] 1  [] 2  [x] 3  [] 4  [] NC  [] N/A     Rating  [] 1  [] 2  [x] 3  [] 4  [] NC  [] N/A     Rating  [] 1  [] 2  [] 3  [] 4  [] NC  [] N/A           Incorporating nutritional management into lifestyle: Describe effect of type, amount & timing of food on blood glucose; Describe basic meal planning techniques & current nutrition guidelines; Correctly read food labels & demonstrate CHO counting & portion control with personalized meal plan.   Rating  [x] 1  [] 2  [] 3  [] 4  [] NC  [] N/A     Rating  [] 1  [] 2  [x] 3  [] 4  [] NC  [] N/A     Rating  [] 1  [] 2  [x] 3  [] 4  [] NC  [] N/A     Rating  [] 1  [] 2  [] 3  [] 4  [] NC  [] N/A                 Incorporating physical activity into lifestyle:   State effect of exercise on blood glucose levels. Identifies personal exercise plan and contraindications. Discussed safety tips while exercising. Rating  [x] 1  [] 2  [] 3  [] 4  [] NC  [] N/A     Rating  [] 1  [] 2  [x] 3  [] 4  [] NC  [] N/A       Rating  [] 1  [] 2  [] 3  [] 4  [] NC  [] N/A     Rating  [] 1  [] 2  [] 3  [] 4  [] NC  [] N/A           Using medications safely: State effect of diabetes medicines on diabetes;   Name diabetes medication taking, action, timing & side effects. Rating  [] 1  [] 2  [] 3  [] 4  [] NC  [] N/A       Rating  [] 1  [] 2  [] 3  [] 4  [] NC  [] N/A         Rating  [] 1  [] 2  [] 3  [] 4  [] NC  [] N/A   Rating  [] 1  [] 2  [] 3  [] 4  [] NC  [] N/A      ________             Insulin/injectable-  Appropriate injection site; proper storage; proper technique; safe needle disposal.   Rating  [] 1  [] 2  [] 3  [] 4  [] NC  [] N/A Rating  [] 1  [] 2  [] 3  [] 4  [] NC  [] N/A Rating  [] 1  [] 2  [] 3  [] 4  [] NC  [] N/A Rating  [] 1  [] 2  [] 3  [] 4  [] NC  [] N/A        Monitoring blood glucose, interpreting and using results: Identify recommended blood glucose targets, personal targets, A1C target, importance of logging glucose,appropriate techniques and problem solving. Safe lancet disposal.    Rating  [x] 1  [] 2  [] 3  [] 4  [] NC  [] N/A     Rating  [] 1  [] 2  [x] 3  [] 4  [] NC  [] N/A       Rating  [] 1  [] 2  [x] 3  [] 4  [] NC  [] N/A     Rating  [] 1  [] 2  [] 3  [] 4  [] NC  [] N/A      Not currently monitoring. Instructed on monitoring his blood sugar at key times and keeping a log. Blood sugar 192 at 10:00. He last ate a pack of crackers and cheese at 7:00 AM.    Prevention, detection & treatment of acute complications: List symptoms of hyper- and hypoglycemia;    Describe how to treat low blood sugar & actions for lowering high blood glucose levels. Prevention and treatment strategies. Rating  [x] 1  [] 2  [] 3  [] 4  [] NC  [] N/A   Rating  [] 1  [] 2  [x] 3  [] 4  [] NC  [] N/A   Rating  [] 1  [] 2  [x] 3  [] 4  [] NC  [] N/A Rating  [] 1  [] 2  [] 3  [] 4  [] NC  [] N/A                   Describe sick day guidelines and indications for physician notification. Rating  [] 1  [] 2  [] 3  [] 4  [] NC  [] N/A     Rating  [] 1  [] 2  [] 3  [] 4  [] NC  [] N/A     Rating  [] 1  [] 2  [] 3  [] 4  [] NC  [] N/A     Rating  [] 1  [] 2  [] 3  [] 4  [] NC  [] N/A        Prevention, detection & treatment of chronic complications: Define the natural course of diabetes & describe the relationship of blood glucose levels to long term complications of diabetes. Identify preventative measures and standard of care. Rating  [] 1  [] 2  [] 3  [] 4  [] NC  [] N/A     Rating  [] 1  [] 2  [] 3  [] 4  [] NC  [] N/A     Rating  [] 1  [] 2  [] 3  [] 4  [] NC  [] N/A     Rating  [] 1  [] 2  [] 3  [] 4  [] NC  [] N/A      Developing strategies to address psychosocial issues: Describe feelings about living with diabetes; Identify support needed & support network. Describe how stress, depression, and anxiety affect blood glucose. Identify coping strategies. Rating  [x] 1  [] 2  [] 3  [] 4  [] NC  [] N/A       Rating  [] 1  [] 2  [x] 3  [] 4  [] NC  [] N/A     Rating  [] 1  [] 2  [x] 3  [] 4  [] NC  [] N/A     Rating  [] 1  [] 2  [] 3  [] 4  [] NC  [] N/A          Developing strategies to promote health/change behavior:  Define the ABCs of diabetes; Identify appropriate screenings, schedule & personal plan for screenings. Identify 7 self-care behaviors. Benefits, challenges and strategies for behavioral change.       Rating  [x] 1  [] 2  [] 3  [] 4  [] NC  [] N/A     Rating  [] 1  [] 2  [] 3  [] 4  [] NC  [] N/A     Rating  [] 1  [] 2  [x] 3  [] 4  [] NC  [] N/A     Rating  [] 1  [] 2  [] 3  [] 4  [] NC  [] N/A             Time spent with patient: 72 Minutes    Next Appointment: Class 3: 6/29/21 at 4:00 PM; Dietitian: 6-25-21 at 2:00 PM     Instruction Method: [x]Lecture/Discussion  []Power Point Presentation  [x]Handouts  []Return Demonstration     Education Materials/Equipment Provided:      [x]Self-Management - Initial assessment - ADA  Where do I Begin? Living with Type 2 diabetes\" booklet;  Diet meal planning basics, handout on diabetes education classes, hyper/hypoglycemia signs/symptoms and treatment handout; Diabetes zones; Support plan resource list.      [x]Self-Management  Class 1 - Diabetes: Your Take Control Guide\" booklet. Healthy Interactions [facility] \"On The Road To Better Managing Your Diabetes\". [x] Self-Management  Class 2 -  \"Traveling with Diabetes\", Dining Out With Diabetes, \"Coping with Diabetes\", \"Diabetes Disaster Planning\", \"Know Your Numbers/Diabetes Care Checklist\", [address] [place] [address] Blood Sugar, Low Blood Sugar. [] Self-Management  Class 3 - \"Risk Factors for CVD\", foot care tips sheet and \"How to pick the right shoe\", \"Medications Used To Treat Diabetes\", How To Care For Your Teeth and Gums\", \"Vaccinations For Adults with Diabetes\", \"Type 2 diabetes and the role of GLP-1\". Healthy Interactions Map \"Continuing Your Journey\". []Self-Management - 3 month follow-up      []Glucose Meter      []Insulin Kit      []Other        Handouts/Booklets given:     [x] Diabetes-Your Take Control Guide 6-16-21   [x] Daily Diabetic Meal Planning Guide   [] Nutrition in the [facility] [address]    [x] Resources for People With Diabetes   [] Other       Diabetes Self Management Support Plan: To assist and support your continued progress in managing your diabetes following education-    (  )  Gym or exercise program of your choice.            (Suggestions:  [employer], Circuit training, any exercise facility and your local Physical Therapy or Cardiac Rehabilitation exercise Program)      (  )   Library    ( x )    ADA website: BrowserReview.ca. org    (  )   Http: DotProtection.gl    (  )   Diabetes Forecast [place] you may get this information on the ADA web site.     (  )  Diabetes Interview - [place]   [phone]    (  )  Diabetes Self Management (bi-monthly magazine)  [phone]    (  ) Support group: (  ) Support group: Dia first Tuesday of the month at 9 am and Kari Russell third Wednesday of the month at 9 am    (  )  Health Journeys [employer]  (relaxation tapes for people with Diabetes)  [phone]    (  )  Your suggestions:                           Briana Dumas RN  [facility]  Diabetes clinic educator  6/16/2021  5:21 PM

## 2021-06-25 ENCOUNTER — HOSPITAL ENCOUNTER (OUTPATIENT)
Dept: NUTRITION | Age: 61
Discharge: HOME | End: 2021-06-25
Payer: COMMERCIAL

## 2021-06-25 PROCEDURE — 97802 MEDICAL NUTRITION INDIV IN: CPT

## 2021-06-25 NOTE — PROGRESS NOTES
MNT provided for Diabetes    Food and nutrition-related knowledge deficit related to Lack of previous MNT/currently undergoing MNT as evidenced by New diagnosis of Diabetes    Client data    Ht: 72\" Wt: 346.2# BMI: 46.95 (obese III)   Weight changes: losing intentionally CBW: 157.3 kg   BMR: 2955 calories Est. total calorie needs: ~0864-9516       Client overall goal for weight is for losses towards a healthier BMI. Current eating pattern includes 3 meals a day, with a little snack prior breakfast as well, and an HS snack. He used to only eat once a day prior to beginning education. Use of whole grains, whole fruits and vegetables appear lower than recommended quantities, but appears willing to improve on this. Has begun looking at food labels, but not doing much measuring. Has used more at home meals than since his heart attack. Current meals still under-cut recommended carbohydrates for his needs. Drinking primarily water or powerade zero now. There is an excess of fatty foods and fatty condiments per recall. Client presents for MNT today with self. Client was educated on carbohydrate counting with the following goals at meals and snacks:  Snack: 15 grams  Breakfast: 45 grams  Lunch: 60 grams  Supper: 60 grams  Bedtime Snack: 15 grams    Client received information on limiting fat in diet to lessen heart disease risk, with goals of: Total Fat: 67 grams  Saturated Fat: 13 grams  Trans Fat: 0 grams daily     Discussed and provided literature on:  Carbohydrate counting, utilizing materials: Choose Your Foods book for meal planning, Food Label, MyPlate and individual carbohydrate counts (as noted above). Reinforced importance of measuring portions and reading food labels for Total Carbohydrate and Serving Sizes. Discussed improving meal-time variety and allowing carbohydrates into meals.  Assured him that many foods that his diabetic friends had warned him about, are perfectly fine, when had in the right portion, and time. Answered questions on fruits, portions and tavern food. Suggested getting a food scale for home use    Client goals:   Continue to choose 3 meals daily    Measure food portions    Read food labels for Total Carbohydrate and Serving Size    Keep consistent meal timing    Avoid excess fat, saturated fat and trans fats (try \"lite\" dressings and spreads)    Develop a routine exercise program    Use MyPlate as template for nutrition balance    Look up restaurant and fast food information via computer or smartphone    Consider getting a food scale to weigh out portions of items like baked potato, home baked goods    Keep up the positive changes    Expected compliance:  Good. Client with appropriate questions throughout. Seems motivated s/p heart attack to get healthier. Client appears to be in an action phase of change. Recommend follow up as needed. RD name and phone number provided. Thank you for the referral.    Electronically signed by Courtney Melvin RD, LD on 6/25/2021 at 10:58 AM    Education session duration: 60 minutes; ([id number]). Reminder to ordering Physician/Provider:  Diabetes and CKD (non-dialysis) patients may have 2 hours of MNT education in subsequent years. Hours can be spread over any number of visits.

## 2021-06-29 ENCOUNTER — HOSPITAL ENCOUNTER (OUTPATIENT)
Dept: DIABETES SERVICES | Age: 61
Setting detail: THERAPIES SERIES
Discharge: HOME | End: 2021-06-29
Payer: COMMERCIAL

## 2021-06-29 VITALS — BODY MASS INDEX: 42.66 KG/M2 | WEIGHT: 315 LBS | HEIGHT: 72 IN

## 2021-06-29 PROCEDURE — G0108 DIAB MANAGE TRN  PER INDIV: HCPCS

## 2021-06-29 NOTE — PROGRESS NOTES
Diabetes Self-Management Education Record  Class 3  Topics:  1 The natural course of diabetes  2 Recognizing the fact that it may become more difficult to keep your blood glucose within your target range  3 The potential long-term complications of diabetes  4 How to delay or reduce the risk of long-term complications by keeping your blood glucose on target  5 The importance of checking for long-term complications and knowing your ABCs  6 How oral diabetes medications work  7 How other diabetes medications work  8 Defining the different types of insulin            Progress Note:  Kasandra Mendoza here for diabetes education class 3. Kasandra Mendoza was recently diagnosed with type 2 DM with an A1C of 6.8%. He is trying to manage his diabetes without medication but asks twice if he \"needs to start taking something. \" Kasandra Mendoza has been diligent about his diabetes management. He continues exercising and watching his food portions. Since initial visit he has lost over 7 lbs and states he \"just feels really different and good since [he] stopped drinking pop and started eating smaller portions. \" He is monitoring his blood sugar 3 times a day but plans to cut back to only twice a day. Kasandra Mendoza did not bring his blood sugar log but recalls fasting readings have been 140's - 150's with one reading that was only 70, and bedtime readings 120 - 130. Denies hypoglycemia symptoms, but reviewed proper treatment. Morning blood sugars remain higher than ADA's recommended target even though he is carefully following a diabetic diet, which usually includes a 15 gram CHO snack at bedtime. He gets up for work at [address] [facility] every morning and since last visit has been checking his blood sugar at 0800, with slightly lower readings than when he was checking at 4 AM. Blood sugar was 136 when checked in this office at 1600. He last ate an hour ago and had a sausage sandwich with one slice of bread.  States he was hungry when he got off work because the packed lunch he took to work [place] gone bad. \" Lissette Welch is trying to get 60 grams of CHO per meal and is eating 3 meals a day at consistent times. All above topics discussed for class 3. Three month follow-up scheduled for 9-14-21 at 4:00 PM. Will continue to follow and support as needed. Encouraged to call with questions or concerns.      Participant Name: Bella Romero   Referring Provider:  Asad Abdi MD    Keys to learning:  Considerations: []Language []Emotional []Health Literacy  []Cognitive []Memory changes []Financial []Cultural   []Sikh []Vision []Hearing  []Speech []Lack of desire  []Literacy  []Psycho-social  []None [x] Covid-19 group restrictions  If considerations are noted, accommodations made: Individual    Identified barriers to learning/self management: None    The following information was discussed:    [x] Diabetes disease process and treatment options   [x] Healthy nutrition, carbohydrate counting, meal planning  [x] Monitoring blood glucose and other parameters; interpreting and using results  [x] Acute complications--prevention, detection and treatment  [x] Medication management and safety   [x] Incorporating physical activity into lifestyle   [x] Exercise for Health, Reducing Risks for Heart Disease, Diabetes and Heart Health  [x] Preventing, through risk reduction behaviors, detecting, and treating chronic complications  [x] Sick Day management  [x] Developing personalized strategies to address psychosocial issues and concerns  [x] Developing personalized strategies to promote health and behavior change through goalsetting, behavior change strategies aimed at risk reduction  [x] Special situations--disaster planning, travel, social activities  [x] Foot, skin, and dental care    Session Assessment & Evaluation Ratings:  1=Needs Instruction  2=Needs Review  3=Comprehends Key Points  4=Demonstrates Understanding/Competency  NC=Not Covered   N/A=Not Applicable Initial  Assess    Date:  6/3/21 2nd  Visit     Date:  6/10/21 3rd  Visit    Date:  6/16/21 4th  Visit    Date:  6/29/21 Comments  S.O.C=Stage of Change/Readiness to change:  · Pre=Pre-contemplation stage--not thinking about changing  · C=Contemplation stage--ambivalent about changing  · P=Preparation stage--prepared to made a specific change  · A=Action stage--committed to modify behaviors  · M=Maintenance and relapse prevention--incorporating new behavior   Participant Stated  Goal      Healthy Eating - 3 meals a day with a bedtime snack, read labels, count CHO                      Participant Stated Goal       S. O.C  [] PRE  [] C  [x] P      [] A  [] M                    S.O.C  [] PRE  [] C  [] P      [] A  [] M     S.O.C  [] PRE  [] C  [] P      [] A  [x] M                     S.O.C  [] PRE  [] C  [] P      [] A  [] M      S.O.C  [] PRE  [] C  [] P      [] A  [x] M                    S.O.C  [] PRE  [] C  [] P      [] A  [] M     S.O.C  [] PRE  [] C  [] P      [] A  [x] M                    S.O.C  [] PRE  [] C  [] P      [] A  [] M   Current main goal attainment frequency:   [] Never  [x] Some  [] Half  [x] Most 6/16/21  [] All    Participant confidence to master goal this visit:   [x] Excellent  [x] Good  [] Carl Christianson  [] Poor            Current main goal attainment frequency:   [] Never  [] Some  [] Half  [] Most  [] All    Participant confidence to master goal this visit:   [] Excellent  [] Good [] Fair  [] Poor                  Session  Topics & Learning Objectives:      Comments:   Diabetes disease process & Treatment process: Define diabetes & prediabetes; identify own type of diabetes; role of the pancreas; signs/symptoms; diagnostic criteria; prevention and treatment options; contributing factors.                   Rating  [x] 1  [] 2  [] 3  [] 4      [] NC  [] N/A   Rating  [] 1  [] 2  [x] 3  [] 4  [] NC  [] N/A     Rating  [] 1  [] 2  [x] 3  [] 4  [] NC  [] N/A     Rating  [] 1  [] 2  [] 3  [] 4  [] NC  [] N/A           Incorporating nutritional management into lifestyle: Describe effect of type, amount & timing of food on blood glucose; Describe basic meal planning techniques & current nutrition guidelines; Correctly read food labels & demonstrate CHO counting & portion control with personalized meal plan. Rating  [x] 1  [] 2  [] 3  [] 4  [] NC  [] N/A     Rating  [] 1  [] 2  [x] 3  [] 4  [] NC  [] N/A     Rating  [] 1  [] 2  [x] 3  [] 4  [] NC  [] N/A     Rating  [] 1  [] 2  [] 3  [] 4  [] NC  [] N/A                 Incorporating physical activity into lifestyle:   State effect of exercise on blood glucose levels. Identifies personal exercise plan and contraindications. Discussed safety tips while exercising. Rating  [x] 1  [] 2  [] 3  [] 4  [] NC  [] N/A     Rating  [] 1  [] 2  [x] 3  [] 4  [] NC  [] N/A       Rating  [] 1  [] 2  [] 3  [] 4  [] NC  [] N/A     Rating  [] 1  [] 2  [] 3  [] 4  [] NC  [] N/A           Using medications safely: State effect of diabetes medicines on diabetes;   Name diabetes medication taking, action, timing & side effects. Rating  [] 1  [] 2  [] 3  [] 4  [] NC  [] N/A       Rating  [] 1  [] 2  [] 3  [] 4  [] NC  [] N/A         Rating  [] 1  [] 2  [] 3  [] 4  [] NC  [] N/A   Rating  [] 1  [] 2  [x] 3  [] 4  [] NC  [] N/A      ________             Insulin/injectable-  Appropriate injection site; proper storage; proper technique; safe needle disposal.   Rating  [] 1  [] 2  [] 3  [] 4  [] NC  [] N/A Rating  [] 1  [] 2  [] 3  [] 4  [] NC  [] N/A Rating  [] 1  [] 2  [] 3  [] 4  [] NC  [] N/A Rating  [] 1  [] 2  [x] 3  [] 4  [] NC  [] N/A        Monitoring blood glucose, interpreting and using results: Identify recommended blood glucose targets, personal targets, A1C target, importance of logging glucose,appropriate techniques and problem solving. Safe lancet disposal.    Rating  [x] 1  [] 2  [] 3  [] 4  [] NC  [] N/A     Rating  [] 1  [] 2  [x] 3  [] 4  [] NC  [] N/A       Rating  [] 1  [] 2  [x] 3  [] 4  [] NC  [] N/A     Rating  [] 1  [] 2  [] 3  [] 4  [] NC  [] N/A      Not currently monitoring. Instructed on monitoring his blood sugar at key times and keeping a log. Blood sugar 192 at 10:00. He last ate a pack of crackers and cheese at 7:00 AM.   Has been monitoring 3x a day but will cut back to twice a day. Prevention, detection & treatment of acute complications: List symptoms of hyper- and hypoglycemia; Describe how to treat low blood sugar & actions for lowering high blood glucose levels. Prevention and treatment strategies. Rating  [x] 1  [] 2  [] 3  [] 4  [] NC  [] N/A   Rating  [] 1  [] 2  [x] 3  [] 4  [] NC  [] N/A   Rating  [] 1  [] 2  [x] 3  [] 4  [] NC  [] N/A Rating  [] 1  [] 2  [] 3  [] 4  [] NC  [] N/A                   Describe sick day guidelines and indications for physician notification. Rating  [] 1  [] 2  [] 3  [] 4  [] NC  [] N/A     Rating  [] 1  [] 2  [] 3  [] 4  [] NC  [] N/A     Rating  [] 1  [] 2  [] 3  [] 4  [] NC  [] N/A     Rating  [] 1  [] 2  [] 3  [] 4  [] NC  [] N/A        Prevention, detection & treatment of chronic complications: Define the natural course of diabetes & describe the relationship of blood glucose levels to long term complications of diabetes. Identify preventative measures and standard of care. Rating  [] 1  [] 2  [] 3  [] 4  [] NC  [] N/A     Rating  [] 1  [] 2  [] 3  [] 4  [] NC  [] N/A     Rating  [] 1  [] 2  [] 3  [] 4  [] NC  [] N/A     Rating  [] 1  [] 2  [x] 3  [] 4  [] NC  [] N/A      Developing strategies to address psychosocial issues: Describe feelings about living with diabetes; Identify support needed & support network. Describe how stress, depression, and anxiety affect blood glucose. Identify coping strategies.  Rating  [x] 1  [] 2  [] 3  [] 4  [] NC  [] N/A       Rating  [] 1  [] 2  [x] 3  [] 4  [] NC  [] N/A     Rating  [] 1  [] 2  [x] 3  [] 4  [] NC  [] N/A     Rating  [] 1  [] 2  [] 3  [] 4  [] NC  [] N/A          Developing strategies to promote health/change behavior:  Define the ABCs of diabetes; Identify appropriate screenings, schedule & personal plan for screenings. Identify 7 self-care behaviors. Benefits, challenges and strategies for behavioral change. Rating  [x] 1  [] 2  [] 3  [] 4  [] NC  [] N/A     Rating  [] 1  [] 2  [] 3  [] 4  [] NC  [] N/A     Rating  [] 1  [] 2  [x] 3  [] 4  [] NC  [] N/A     Rating  [] 1  [] 2  [] 3  [] 4  [] NC  [] N/A             Time spent with patient: 48 Minutes    Next Appointment: 3 month follow-up 9-14-21 at 4:00 PM; Dietitian: 6-25-21 at 2:00 PM     Instruction Method: [x]Lecture/Discussion  []Power Point Presentation  [x]Handouts  []Return Demonstration     Education Materials/Equipment Provided:      [x]Self-Management - Initial assessment - ADA  Where do I Begin? Living with Type 2 diabetes\" booklet;  Diet meal planning basics, handout on diabetes education classes, hyper/hypoglycemia signs/symptoms and treatment handout; Diabetes zones; Support plan resource list.      [x]Self-Management  Class 1 - Diabetes: Your Take Control Guide\" booklet. Healthy [facility] \"On The Road To Better Managing Your Diabetes\". [x] Self-Management  Class 2 -  \"Traveling with Diabetes\", Dining Out With Diabetes, \"Coping with Diabetes\", \"Diabetes Disaster Planning\", \"Know Your Numbers/Diabetes Care Checklist\", [address] [place] [address] Blood Sugar, Low Blood Sugar. [x] Self-Management  Class 3 - \"Risk Factors for CVD\", foot care tips sheet and \"How to pick the right shoe\", \"Medications Used To Treat Diabetes\", How To Care For Your Teeth and Gums\", \"Vaccinations For Adults with Diabetes\", \"Type 2 diabetes and the role of GLP-1\".      []Self-Management - 3 month follow-up      []Glucose Meter      []Insulin Kit      []Other        Handouts/Booklets given:     [x] Diabetes-Your Take Control Guide 6-16-21   [x] Daily Diabetic Meal Planning Guide   [] Nutrition in the [employer] Resources    [x] Resources for People With Diabetes   [] Other       Diabetes Self Management Support Plan: To assist and support your continued progress in managing your diabetes following education-    (  )  Gym or exercise program of your choice. (Suggestions:  YM[employer], Circuit training, any exercise facility and your local Physical Therapy or Cardiac Rehabilitation exercise Program)      (  )   Library    ( x )    ADA website:  PlayBucks.ca. org    (  )   Http: Sadra Medicalection.Elance    (  )   Diabetes Forecast [place] you may get this information on the ADA web site.     (  )  Diabetes Interview - [place]   [phone]    (  )  Diabetes Self Management (bi-monthly magazine)  [phone]    (  ) Support group: (  ) Support group: Dia first Tuesday of the month at 9 am and Margaret Michelle third Wednesday of the month at 9 am    (  )  Health Journeys Image Paths  (relaxation tapes for people with Diabetes)  [phone]    (  )  Your suggestions:                           Odilon Dueñas RN  [facility]  Diabetes clinic educator  6/29/2021  5:28 PM

## 2021-09-15 ENCOUNTER — HOSPITAL ENCOUNTER (OUTPATIENT)
Dept: DIABETES SERVICES | Age: 61
Setting detail: THERAPIES SERIES
Discharge: HOME | End: 2021-09-15
Payer: COMMERCIAL

## 2021-09-15 NOTE — PROGRESS NOTES
Diabetes Self-Management Program Follow-Up  Spoke with Braeden Gonzalez for approx 25 minutes via telephone for diabetes education 3 month follow-up. Braeden Gonzalez has attended all scheduled diabetes education classes, including a visit with the dietitian. He has been diligently working to control his diabetes. He has \"cut out sweets completely and cut down on portion sizes. \" He has been drinking Gatorade and Power Raven with zero sugar. He \"tries to get 60 grams\" of CHO for each meal and 15-20 grams for a bedtime snack. Bedtime snacks usually consist of cheese and crackers, peanut butter crackers, or celery with peanut butter. Blood sugars remain high in the morning with readings ranging 120 - 156. He recently started taking metformin 500 mg with supper as advised. Kory monitors his glucose 3-4 times a day. Blood sugars after work and 2 hours after supper are usually in the \"80's - 90's\" range. Blood sugar was 156 this morning. States last night he had a crustless chicken pot pie and 1 1/2 slices of buttered bread for supper. Encouraged to get the 60 grams for supper as recommended by dietitian even though, he admits, he enjoys a larger meal for lunch (60 grams) but a lighter meal for supper. Braeden Gonzalez continues to walk for at least 40 minutes 4 times a week for exercise. Will call in 1-2 weeks to check on morning readings, and will support as needed. Name:  Sophia Dean Date:  9/15/2021   Class Dates:  6/3/21: assessment; 6/10/21: class 1; 6/16/21: class 2; 6/25/21: dietitian; 6/29/21: class 3  YOB: 1960   Goal:  Healthy Eating- 3 meals/day, read labels, count CHO, bedtime snack.   How often did you meet your goal?     []All the time (5)   [x]Most of the time (4)   []Half the time (3)   []Occasionally (2)    []Never (1)  Modify goal: No      No results found for: LABA1C  Lab Results   Component Value Date    CREATININE 1.09 04/01/2020       FBS range:120-156 Fasting      Physician Appointment (date of most recent or next scheduled): 10-4-21  Recent health problems or change in diabetes medications: Metformin added  Been admitted to hospital or ED visit last 4 months? No  Do you know the amount of carbohydrates you eat per meal?   [x]Yes   []No 60 grams  Frequency of self-foot exam:   [x]Daily  []Other   Eye exam scheduled? [x]Yes   []No Had cataract surgery in December and follow-up in June where \"everything was perfect. \"  How many times a day do you check your blood sugar? []1   []2   [x]3   [x]4   []more  Have you been exercising since class? [x]Yes    []No   If yes, what kind? Walking for 40 minutes   If yes, how many days/week? []1   []2   []3   [x]4   []more  How often do you miss taking your medications? []All the time (5)   []Most of the time (4)   []Half the time (3)   []Occasionally (2)  [x]Never (1)      Diabetes Self Management Support Plan: To assist and support your continued progress in managing your diabetes following    education    (  )  Gym or exercise program of your choice. (Suggestions:  [employer], Circuit training, any exercise facility and your local Physical Therapy or Cardiac Rehabilitation exercise Program )      (  )   Library      ( x )    ADA website:  BrowserReIdenIve.ca. org      (  )   Http: DotProtection.gl      (  )   Diabetes Forecast [place] you may get this information on the ADA web site.       (  )  Diabetes Interview - [place]   [phone]      (  )  Diabetes Self Management (bi-monthly magazine)  [phone]      (  )  Health Journeys Image Paths  (relaxation tapes for people with Diabetes)          [phone]    (  )  Diabetes Support Group :  Monthly every third Wednesday of the month at [address] starts at [place].  Meetings at [facility]    (  )  Your suggestions:         Feelings/Attitudes/Other questions:     Sunshine Pillai RN  [facility]  Diabetes clinic educator  9/15/2021  4:32 PM

## 2021-09-16 VITALS — WEIGHT: 315 LBS | HEIGHT: 72 IN | BODY MASS INDEX: 42.66 KG/M2

## 2023-09-14 ENCOUNTER — APPOINTMENT (OUTPATIENT)
Dept: GENERAL RADIOLOGY | Age: 63
End: 2023-09-14
Payer: COMMERCIAL

## 2023-09-14 ENCOUNTER — HOSPITAL ENCOUNTER (EMERGENCY)
Age: 63
Discharge: HOME | End: 2023-09-15
Attending: EMERGENCY MEDICINE
Payer: COMMERCIAL

## 2023-09-14 DIAGNOSIS — S80.02XA CONTUSION OF LEFT KNEE, INITIAL ENCOUNTER: ICD-10-CM
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
## 2023-09-14 DIAGNOSIS — S40.021A ARM CONTUSION, RIGHT, INITIAL ENCOUNTER: ICD-10-CM

## 2023-09-14 PROCEDURE — 73070 X-RAY EXAM OF ELBOW: CPT

## 2023-09-14 PROCEDURE — 99284 EMERGENCY DEPT VISIT MOD MDM: CPT

## 2023-09-14 PROCEDURE — 73090 X-RAY EXAM OF FOREARM: CPT

## 2023-09-14 PROCEDURE — 96372 THER/PROPH/DIAG INJ SC/IM: CPT

## 2023-09-14 PROCEDURE — 6370000000 HC RX 637 (ALT 250 FOR IP): Performed by: EMERGENCY MEDICINE

## 2023-09-14 PROCEDURE — 73060 X-RAY EXAM OF HUMERUS: CPT

## 2023-09-14 RX ORDER — ACETAMINOPHEN 325 MG/1
650 TABLET ORAL ONCE
Status: COMPLETED | OUTPATIENT
Start: 2023-09-14 | End: 2023-09-14

## 2023-09-14 RX ORDER — GINSENG 100 MG
CAPSULE ORAL 3 TIMES DAILY
Status: DISCONTINUED | OUTPATIENT
Start: 2023-09-15 | End: 2023-09-15 | Stop reason: HOSPADM

## 2023-09-14 RX ADMIN — Medication 650 MG: at 23:50

## 2023-09-14 ASSESSMENT — ENCOUNTER SYMPTOMS
FACIAL SWELLING: 0
COLOR CHANGE: 0
CHEST TIGHTNESS: 0
BACK PAIN: 0
SHORTNESS OF BREATH: 0
WHEEZING: 0

## 2023-09-14 ASSESSMENT — LIFESTYLE VARIABLES
HOW MANY STANDARD DRINKS CONTAINING ALCOHOL DO YOU HAVE ON A TYPICAL DAY: PATIENT DOES NOT DRINK
HOW OFTEN DO YOU HAVE A DRINK CONTAINING ALCOHOL: NEVER

## 2023-09-14 ASSESSMENT — PAIN SCALES - GENERAL: PAINLEVEL_OUTOF10: 6

## 2023-09-15 ENCOUNTER — APPOINTMENT (OUTPATIENT)
Dept: CT IMAGING | Age: 63
End: 2023-09-15
Payer: COMMERCIAL

## 2023-09-15 ENCOUNTER — APPOINTMENT (OUTPATIENT)
Dept: GENERAL RADIOLOGY | Age: 63
End: 2023-09-15
Payer: COMMERCIAL

## 2023-09-15 VITALS
BODY MASS INDEX: 40.55 KG/M2 | OXYGEN SATURATION: 95 % | TEMPERATURE: 99 F | SYSTOLIC BLOOD PRESSURE: 147 MMHG | WEIGHT: 299 LBS | HEART RATE: 74 BPM | DIASTOLIC BLOOD PRESSURE: 93 MMHG | RESPIRATION RATE: 18 BRPM

## 2023-09-15 PROCEDURE — 73700 CT LOWER EXTREMITY W/O DYE: CPT

## 2023-09-15 PROCEDURE — 90715 TDAP VACCINE 7 YRS/> IM: CPT | Performed by: EMERGENCY MEDICINE

## 2023-09-15 PROCEDURE — 6360000002 HC RX W HCPCS: Performed by: EMERGENCY MEDICINE

## 2023-09-15 PROCEDURE — 73564 X-RAY EXAM KNEE 4 OR MORE: CPT

## 2023-09-15 PROCEDURE — 6370000000 HC RX 637 (ALT 250 FOR IP): Performed by: EMERGENCY MEDICINE

## 2023-09-15 PROCEDURE — 90471 IMMUNIZATION ADMIN: CPT | Performed by: EMERGENCY MEDICINE

## 2023-09-15 RX ORDER — BACITRACIN ZINC AND POLYMYXIN B SULFATE 500; 1000 [USP'U]/G; [USP'U]/G
OINTMENT TOPICAL
Qty: 28.4 G | Refills: 1 | Status: SHIPPED | OUTPATIENT
Start: 2023-09-15 | End: 2023-09-22

## 2023-09-15 RX ADMIN — BACITRACIN: 500 OINTMENT TOPICAL at 00:19

## 2023-09-15 RX ADMIN — TETANUS TOXOID, REDUCED DIPHTHERIA TOXOID AND ACELLULAR PERTUSSIS VACCINE, ADSORBED 0.5 ML: 5; 2.5; 8; 8; 2.5 SUSPENSION INTRAMUSCULAR at 00:18

## 2023-09-15 ASSESSMENT — PAIN DESCRIPTION - ORIENTATION: ORIENTATION: LEFT

## 2023-09-15 ASSESSMENT — PAIN SCALES - GENERAL: PAINLEVEL_OUTOF10: 3

## 2023-09-15 ASSESSMENT — PAIN DESCRIPTION - LOCATION: LOCATION: KNEE

## 2023-09-15 ASSESSMENT — PAIN DESCRIPTION - DESCRIPTORS: DESCRIPTORS: ACHING

## 2023-09-15 ASSESSMENT — PAIN - FUNCTIONAL ASSESSMENT: PAIN_FUNCTIONAL_ASSESSMENT: 0-10
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
## 2023-09-15 NOTE — ED NOTES
Returned from [facility], physician to bedside, planning on getting additional imaging of left knee       Pilar Malik, RN  09/15/23 0022
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
## 2024-05-06 ENCOUNTER — HOSPITAL ENCOUNTER (EMERGENCY)
Age: 64
Discharge: HOME | End: 2024-05-06
Attending: FAMILY MEDICINE
Payer: COMMERCIAL

## 2024-05-06 ENCOUNTER — APPOINTMENT (OUTPATIENT)
Dept: GENERAL RADIOLOGY | Age: 64
End: 2024-05-06
Payer: COMMERCIAL

## 2024-05-06 VITALS
SYSTOLIC BLOOD PRESSURE: 127 MMHG | HEIGHT: 72 IN | OXYGEN SATURATION: 91 % | HEART RATE: 72 BPM | WEIGHT: 300 LBS | DIASTOLIC BLOOD PRESSURE: 80 MMHG | BODY MASS INDEX: 40.63 KG/M2 | RESPIRATION RATE: 18 BRPM | TEMPERATURE: 98.7 F

## 2024-05-06 DIAGNOSIS — J20.9 ACUTE BRONCHITIS, UNSPECIFIED ORGANISM: Primary | ICD-10-CM

## 2024-05-06 LAB
ANION GAP SERPL CALCULATED.3IONS-SCNC: 11 MMOL/L (ref 9–17)
BASOPHILS # BLD: 0.05 K/UL (ref 0–0.2)
BASOPHILS NFR BLD: 1 % (ref 0–2)
BUN SERPL-MCNC: 13 MG/DL (ref 8–23)
BUN/CREAT SERPL: 16 (ref 9–20)
CALCIUM SERPL-MCNC: 9.3 MG/DL (ref 8.6–10.4)
CHLORIDE SERPL-SCNC: 98 MMOL/L (ref 98–107)
CO2 SERPL-SCNC: 31 MMOL/L (ref 20–31)
CREAT SERPL-MCNC: 0.8 MG/DL (ref 0.7–1.2)
EOSINOPHIL # BLD: 0.3 K/UL (ref 0–0.4)
EOSINOPHILS RELATIVE PERCENT: 3 % (ref 0–5)
ERYTHROCYTE [DISTWIDTH] IN BLOOD BY AUTOMATED COUNT: 12.7 % (ref 12.1–15.2)
GFR, ESTIMATED: >90 ML/MIN/1.73M2
GLUCOSE SERPL-MCNC: 135 MG/DL (ref 70–99)
HCT VFR BLD AUTO: 43.2 % (ref 41–53)
HGB BLD-MCNC: 14.5 G/DL (ref 13.5–17.5)
IMM GRANULOCYTES # BLD AUTO: 0.02 K/UL (ref 0–0.3)
IMM GRANULOCYTES NFR BLD: 0 % (ref 0–5)
LYMPHOCYTES NFR BLD: 1.78 K/UL (ref 1–4.8)
LYMPHOCYTES RELATIVE PERCENT: 17 % (ref 13–44)
MCH RBC QN AUTO: 30.7 PG (ref 26–34)
MCHC RBC AUTO-ENTMCNC: 33.6 G/DL (ref 31–37)
MCV RBC AUTO: 91.3 FL (ref 80–100)
MONOCYTES NFR BLD: 0.91 K/UL (ref 0–1)
MONOCYTES NFR BLD: 9 % (ref 5–9)
NEUTROPHILS NFR BLD: 70 % (ref 39–75)
NEUTS SEG NFR BLD: 7.33 K/UL (ref 2.1–6.5)
PLATELET # BLD AUTO: 215 K/UL (ref 140–450)
PMV BLD AUTO: 10.7 FL (ref 6–12)
POTASSIUM SERPL-SCNC: 3.6 MMOL/L (ref 3.7–5.3)
RBC # BLD AUTO: 4.73 M/UL (ref 4.5–5.9)
SODIUM SERPL-SCNC: 140 MMOL/L (ref 135–144)
TROPONIN I SERPL HS-MCNC: 10 NG/L (ref 0–22)
TROPONIN I SERPL HS-MCNC: 12 NG/L (ref 0–22)
WBC OTHER # BLD: 10.4 K/UL (ref 3.5–11)

## 2024-05-06 PROCEDURE — 71045 X-RAY EXAM CHEST 1 VIEW: CPT

## 2024-05-06 PROCEDURE — 84484 ASSAY OF TROPONIN QUANT: CPT

## 2024-05-06 PROCEDURE — 93005 ELECTROCARDIOGRAM TRACING: CPT | Performed by: FAMILY MEDICINE

## 2024-05-06 PROCEDURE — 36415 COLL VENOUS BLD VENIPUNCTURE: CPT

## 2024-05-06 PROCEDURE — 99285 EMERGENCY DEPT VISIT HI MDM: CPT

## 2024-05-06 PROCEDURE — 6370000000 HC RX 637 (ALT 250 FOR IP): Performed by: FAMILY MEDICINE

## 2024-05-06 PROCEDURE — 80048 BASIC METABOLIC PNL TOTAL CA: CPT

## 2024-05-06 PROCEDURE — 85025 COMPLETE CBC W/AUTO DIFF WBC: CPT

## 2024-05-06 RX ORDER — POTASSIUM CHLORIDE 20 MEQ/1
20 TABLET, EXTENDED RELEASE ORAL DAILY
COMMUNITY
Start: 2021-10-04

## 2024-05-06 RX ORDER — AMOXICILLIN AND CLAVULANATE POTASSIUM 875; 125 MG/1; MG/1
1 TABLET, FILM COATED ORAL 2 TIMES DAILY
Qty: 20 TABLET | Refills: 0 | Status: SHIPPED | OUTPATIENT
Start: 2024-05-06 | End: 2024-05-16

## 2024-05-06 RX ORDER — LISINOPRIL AND HYDROCHLOROTHIAZIDE 20; 12.5 MG/1; MG/1
1 TABLET ORAL 2 TIMES DAILY
COMMUNITY
Start: 2021-04-05

## 2024-05-06 RX ORDER — ASPIRIN 81 MG/1
324 TABLET, CHEWABLE ORAL ONCE
Status: COMPLETED | OUTPATIENT
Start: 2024-05-06 | End: 2024-05-06

## 2024-05-06 RX ORDER — ELUXADOLINE 100 MG/1
100 TABLET, FILM COATED ORAL 2 TIMES DAILY
COMMUNITY
Start: 2023-10-26

## 2024-05-06 RX ADMIN — ASPIRIN 324 MG: 81 TABLET, CHEWABLE ORAL at 16:25

## 2024-05-06 ASSESSMENT — HEART SCORE: ECG: NORMAL

## 2024-05-06 ASSESSMENT — PAIN - FUNCTIONAL ASSESSMENT: PAIN_FUNCTIONAL_ASSESSMENT: NONE - DENIES PAIN

## 2024-05-06 NOTE — ED PROVIDER NOTES
consideration for acute bronchitis, given patient's smoking history, obesity, diabetes, and her history, I feel it may be beneficial to start patient on Augmentin presumptively, discussed with patient and daughter OTC cough cold medications may have some benefit, would recommend Mucinex DM as this would be safe with both respect to his heart as well as diabetes, close outpatient follow-up, return to ER if symptoms change worse other concerns, acknowledged    1)  Number and Complexity of Problems  Problem List This Visit: As above    Differential Diagnosis: ACS, AA, PE, GERD/gastritis/achalasia, PTX, pericarditis, pleurisy, myocarditis, anxiety, msk, dehydration, pneumonia bronchitis URI COPD     Diagnoses Considered but Do Not Suspect: N/A    Pertinent Comorbid Conditions: As above    2)  Data Reviewed  My EKG interpretation:  As above    Decision Rules/Scores utilized: Heart score    Tests considered but not ordered and why: N/A    External Documents Reviewed: N/A    Imaging that is independently reviewed and interpreted by me as: As above    See more data below for the lab and radiology tests and orders.    3)  Treatment and Disposition    Patient repeat assessment:  As above    Disposition discussion with patient/family: Discharge    Case discussed with consulting clinician:  As above    Social determinants of health impacting treatment or disposition: N/A    Shared Decision Making: N/A    Code Status Discussion: N/A      REASSESSMENT     As above      CRITICAL CARE TIME     Total Critical Care time was 0 minutes, excluding separately reportable procedures.  There was a high probability of clinically significant/life threatening deterioration in the patient's condition which required my urgent intervention.      PROCEDURES:  Unless otherwise noted below, none     Procedures    FINAL IMPRESSION      1. Acute bronchitis, unspecified organism          DISPOSITION/PLAN     DISPOSITION Decision To Discharge 05/06/2024

## 2024-05-07 LAB
EKG ATRIAL RATE: 71 BPM
EKG P AXIS: 16 DEGREES
EKG P-R INTERVAL: 164 MS
EKG Q-T INTERVAL: 400 MS
EKG QRS DURATION: 116 MS
EKG QTC CALCULATION (BAZETT): 434 MS
EKG R AXIS: 25 DEGREES
EKG T AXIS: 21 DEGREES
EKG VENTRICULAR RATE: 71 BPM

## 2024-05-07 PROCEDURE — 93010 ELECTROCARDIOGRAM REPORT: CPT | Performed by: INTERNAL MEDICINE
